# Patient Record
Sex: MALE | Race: WHITE | NOT HISPANIC OR LATINO | URBAN - METROPOLITAN AREA
[De-identification: names, ages, dates, MRNs, and addresses within clinical notes are randomized per-mention and may not be internally consistent; named-entity substitution may affect disease eponyms.]

---

## 2018-11-21 ENCOUNTER — OUTPATIENT (OUTPATIENT)
Dept: OUTPATIENT SERVICES | Facility: HOSPITAL | Age: 78
LOS: 1 days | End: 2018-11-21
Payer: MEDICARE

## 2018-11-21 VITALS
DIASTOLIC BLOOD PRESSURE: 76 MMHG | TEMPERATURE: 98 F | WEIGHT: 175.05 LBS | HEART RATE: 56 BPM | OXYGEN SATURATION: 99 % | SYSTOLIC BLOOD PRESSURE: 135 MMHG | HEIGHT: 70 IN | RESPIRATION RATE: 16 BRPM

## 2018-11-21 DIAGNOSIS — Z98.890 OTHER SPECIFIED POSTPROCEDURAL STATES: Chronic | ICD-10-CM

## 2018-11-21 DIAGNOSIS — K40.20 BILATERAL INGUINAL HERNIA, WITHOUT OBSTRUCTION OR GANGRENE, NOT SPECIFIED AS RECURRENT: ICD-10-CM

## 2018-11-21 DIAGNOSIS — Z96.653 PRESENCE OF ARTIFICIAL KNEE JOINT, BILATERAL: Chronic | ICD-10-CM

## 2018-11-21 DIAGNOSIS — Z01.818 ENCOUNTER FOR OTHER PREPROCEDURAL EXAMINATION: ICD-10-CM

## 2018-11-21 DIAGNOSIS — Z98.49 CATARACT EXTRACTION STATUS, UNSPECIFIED EYE: Chronic | ICD-10-CM

## 2018-11-21 LAB
ALBUMIN SERPL ELPH-MCNC: 3.7 G/DL — SIGNIFICANT CHANGE UP (ref 3.3–5)
ALP SERPL-CCNC: 43 U/L — SIGNIFICANT CHANGE UP (ref 30–120)
ALT FLD-CCNC: 29 U/L DA — SIGNIFICANT CHANGE UP (ref 10–60)
ANION GAP SERPL CALC-SCNC: 12 MMOL/L — SIGNIFICANT CHANGE UP (ref 5–17)
AST SERPL-CCNC: 23 U/L — SIGNIFICANT CHANGE UP (ref 10–40)
BILIRUB SERPL-MCNC: 0.6 MG/DL — SIGNIFICANT CHANGE UP (ref 0.2–1.2)
BUN SERPL-MCNC: 28 MG/DL — HIGH (ref 7–23)
CALCIUM SERPL-MCNC: 9 MG/DL — SIGNIFICANT CHANGE UP (ref 8.4–10.5)
CHLORIDE SERPL-SCNC: 105 MMOL/L — SIGNIFICANT CHANGE UP (ref 96–108)
CO2 SERPL-SCNC: 25 MMOL/L — SIGNIFICANT CHANGE UP (ref 22–31)
CREAT SERPL-MCNC: 1.09 MG/DL — SIGNIFICANT CHANGE UP (ref 0.5–1.3)
GLUCOSE SERPL-MCNC: 108 MG/DL — HIGH (ref 70–99)
HCT VFR BLD CALC: 43.1 % — SIGNIFICANT CHANGE UP (ref 39–50)
HGB BLD-MCNC: 14.7 G/DL — SIGNIFICANT CHANGE UP (ref 13–17)
MCHC RBC-ENTMCNC: 31.7 PG — SIGNIFICANT CHANGE UP (ref 27–34)
MCHC RBC-ENTMCNC: 34.1 GM/DL — SIGNIFICANT CHANGE UP (ref 32–36)
MCV RBC AUTO: 93.1 FL — SIGNIFICANT CHANGE UP (ref 80–100)
NRBC # BLD: 0 /100 WBCS — SIGNIFICANT CHANGE UP (ref 0–0)
PLATELET # BLD AUTO: 195 K/UL — SIGNIFICANT CHANGE UP (ref 150–400)
POTASSIUM SERPL-MCNC: 5.2 MMOL/L — SIGNIFICANT CHANGE UP (ref 3.5–5.3)
POTASSIUM SERPL-SCNC: 5.2 MMOL/L — SIGNIFICANT CHANGE UP (ref 3.5–5.3)
PROT SERPL-MCNC: 6.5 G/DL — SIGNIFICANT CHANGE UP (ref 6–8.3)
RBC # BLD: 4.63 M/UL — SIGNIFICANT CHANGE UP (ref 4.2–5.8)
RBC # FLD: 14.9 % — HIGH (ref 10.3–14.5)
SODIUM SERPL-SCNC: 142 MMOL/L — SIGNIFICANT CHANGE UP (ref 135–145)
WBC # BLD: 5.69 K/UL — SIGNIFICANT CHANGE UP (ref 3.8–10.5)
WBC # FLD AUTO: 5.69 K/UL — SIGNIFICANT CHANGE UP (ref 3.8–10.5)

## 2018-11-21 PROCEDURE — 80053 COMPREHEN METABOLIC PANEL: CPT

## 2018-11-21 PROCEDURE — 85027 COMPLETE CBC AUTOMATED: CPT

## 2018-11-21 PROCEDURE — G0463: CPT

## 2018-11-21 PROCEDURE — 93010 ELECTROCARDIOGRAM REPORT: CPT

## 2018-11-21 PROCEDURE — 36415 COLL VENOUS BLD VENIPUNCTURE: CPT

## 2018-11-21 PROCEDURE — 93005 ELECTROCARDIOGRAM TRACING: CPT

## 2018-11-21 NOTE — H&P PST ADULT - PSH
History of cataract extraction  bilateral 2000's  History of knee replacement, total, bilateral  left 2012  right 2014  History of rotator cuff surgery  left 1995

## 2018-11-21 NOTE — H&P PST ADULT - GASTROINTESTINAL DETAILS
no guarding/soft/bowel sounds normal/no distention/nontender/no masses palpable/no rebound tenderness

## 2018-11-21 NOTE — H&P PST ADULT - HISTORY OF PRESENT ILLNESS
79 yo male presents to [facility] for scheduled open repair bilateral inguinal hernia repair on 12/6/18 with Ethan Polanco MD.  Reports bilateral inguinal hernias for 20 years with recent discomfort right side.

## 2018-11-21 NOTE — H&P PST ADULT - PROBLEM SELECTOR PLAN 1
"Open repair bilateral inguinal hernia req PA" on 12/6/18  Diagnostics ordered  Pending medical clearance  Instructions reviewed and signed  contact info given  Best wishes offered

## 2018-11-21 NOTE — H&P PST ADULT - NEGATIVE ENMT SYMPTOMS
no hearing difficulty/no dry mouth/no nose bleeds/no recurrent cold sores/no nasal obstruction/no post-nasal discharge/no abnormal taste sensation/no gum bleeding/no nasal discharge/no ear pain/no vertigo/no tinnitus/no sinus symptoms/no nasal congestion/no throat pain/no dysphagia

## 2018-11-30 NOTE — ASU DISCHARGE PLAN (ADULT/PEDIATRIC). - MEDICATION SUMMARY - MEDICATIONS TO TAKE
I will START or STAY ON the medications listed below when I get home from the hospital:    oxyCODONE 5 mg oral tablet  -- 1 tab(s) by mouth every 4 hours, As Needed MDD:6  -- Indication: For Treatment of moderate incisional pain.    simvastatin 20 mg oral tablet  -- 1 tab(s) by mouth once a day (at bedtime)  -- Indication: For Continuation of home medication.    lisinopril-hydroCHLOROthiazide 20 mg-12.5 mg oral tablet  -- 1 tab(s) by mouth once a day  -- Indication: For Continuation of home medication.

## 2018-11-30 NOTE — ASU DISCHARGE PLAN (ADULT/PEDIATRIC). - SPECIAL INSTRUCTIONS
REST!  Relax!  Ice packs, as directed.  May take Tylenol for minor pain.  Please minimize any Aspirin, Advil or Motrin for first 48 hours.  A script has been forwarded to your pharmacy for Oxycodone to treat moderate pain.  Pain Pumps to be removed prior to first shower in 48 hours.  Take an over the counter stool softener such as COLACE twice daily to prevent constipation.  To call for ANY questions or concerns.

## 2018-11-30 NOTE — ASU DISCHARGE PLAN (ADULT/PEDIATRIC). - FOLLOWUP APPOINTMENT CLINIC/PHYSICIAN
Please call Dr. PAULINA Dumont's office ([phone]) to schedule a follow-up appointment to be seen in 7-10 days. Please call Dr. PAULINA Dumont's office ([phone]) to schedule a follow-up appointment to be seen in 2 weeks.

## 2018-11-30 NOTE — ASU DISCHARGE PLAN (ADULT/PEDIATRIC). - NOTIFY
Persistent Nausea and Vomiting/Bleeding that does not stop/Pain not relieved by Medications/Fever greater than 101/Swelling that continues Numbness, color, or temperature change to extremity/Increased Irritability or Sluggishness/Fever greater than 101/Excessive Diarrhea/Numbness, tingling/Unable to Urinate/Pain not relieved by Medications/Swelling that continues/Bleeding that does not stop/Inability to Tolerate Liquids or Foods/Persistent Nausea and Vomiting

## 2018-11-30 NOTE — ASU DISCHARGE PLAN (ADULT/PEDIATRIC). - INSTRUCTIONS
REST! Apply ice packs to incision sites 20 mins on, 20 mins off every 4 hours for first 24 hours.  If taking narcotic pain medications, may take COLACE (OTC stool softener) as directed to prevent constipation. Go with what you know! REST!  Apply ice packs to incision sites 30 mins on, 30 mins off every hour while awake for first 48 hours.

## 2018-12-05 ENCOUNTER — TRANSCRIPTION ENCOUNTER (OUTPATIENT)
Age: 78
End: 2018-12-05

## 2018-12-06 ENCOUNTER — RESULT REVIEW (OUTPATIENT)
Age: 78
End: 2018-12-06

## 2018-12-06 ENCOUNTER — OUTPATIENT (OUTPATIENT)
Dept: OUTPATIENT SERVICES | Facility: HOSPITAL | Age: 78
LOS: 1 days | End: 2018-12-06
Payer: MEDICARE

## 2018-12-06 VITALS
OXYGEN SATURATION: 97 % | DIASTOLIC BLOOD PRESSURE: 6 MMHG | HEART RATE: 69 BPM | SYSTOLIC BLOOD PRESSURE: 118 MMHG | RESPIRATION RATE: 16 BRPM

## 2018-12-06 VITALS
WEIGHT: 172.18 LBS | RESPIRATION RATE: 19 BRPM | DIASTOLIC BLOOD PRESSURE: 72 MMHG | TEMPERATURE: 98 F | SYSTOLIC BLOOD PRESSURE: 140 MMHG | HEART RATE: 51 BPM | OXYGEN SATURATION: 100 % | HEIGHT: 72 IN

## 2018-12-06 DIAGNOSIS — Z98.890 OTHER SPECIFIED POSTPROCEDURAL STATES: Chronic | ICD-10-CM

## 2018-12-06 DIAGNOSIS — K40.20 BILATERAL INGUINAL HERNIA, WITHOUT OBSTRUCTION OR GANGRENE, NOT SPECIFIED AS RECURRENT: ICD-10-CM

## 2018-12-06 DIAGNOSIS — Z96.653 PRESENCE OF ARTIFICIAL KNEE JOINT, BILATERAL: Chronic | ICD-10-CM

## 2018-12-06 DIAGNOSIS — Z01.818 ENCOUNTER FOR OTHER PREPROCEDURAL EXAMINATION: ICD-10-CM

## 2018-12-06 DIAGNOSIS — Z98.49 CATARACT EXTRACTION STATUS, UNSPECIFIED EYE: Chronic | ICD-10-CM

## 2018-12-06 PROCEDURE — C1781: CPT

## 2018-12-06 PROCEDURE — 11981 INSERTION DRUG DLVR IMPLANT: CPT | Mod: AS

## 2018-12-06 PROCEDURE — 88302 TISSUE EXAM BY PATHOLOGIST: CPT | Mod: 26

## 2018-12-06 PROCEDURE — 49505 PRP I/HERN INIT REDUC >5 YR: CPT | Mod: AS,50

## 2018-12-06 PROCEDURE — 49505 PRP I/HERN INIT REDUC >5 YR: CPT | Mod: 50

## 2018-12-06 PROCEDURE — 88302 TISSUE EXAM BY PATHOLOGIST: CPT

## 2018-12-06 RX ORDER — CEFAZOLIN SODIUM 1 G
2000 VIAL (EA) INJECTION ONCE
Qty: 0 | Refills: 0 | Status: COMPLETED | OUTPATIENT
Start: 2018-12-06 | End: 2018-12-06

## 2018-12-06 RX ORDER — HYDROMORPHONE HYDROCHLORIDE 2 MG/ML
0.5 INJECTION INTRAMUSCULAR; INTRAVENOUS; SUBCUTANEOUS
Qty: 0 | Refills: 0 | Status: DISCONTINUED | OUTPATIENT
Start: 2018-12-06 | End: 2018-12-06

## 2018-12-06 RX ORDER — BUPIVACAINE HCL/PF 7.5 MG/ML
270 VIAL (ML) INJECTION
Qty: 0 | Refills: 0 | Status: DISCONTINUED | OUTPATIENT
Start: 2018-12-06 | End: 2018-12-06

## 2018-12-06 RX ORDER — SODIUM CHLORIDE 9 MG/ML
1000 INJECTION, SOLUTION INTRAVENOUS
Qty: 0 | Refills: 0 | Status: DISCONTINUED | OUTPATIENT
Start: 2018-12-06 | End: 2018-12-06

## 2018-12-06 RX ORDER — OXYCODONE HYDROCHLORIDE 5 MG/1
1 TABLET ORAL
Qty: 0 | Refills: 0 | COMMUNITY

## 2018-12-06 RX ORDER — CHLORHEXIDINE GLUCONATE 213 G/1000ML
1 SOLUTION TOPICAL ONCE
Qty: 0 | Refills: 0 | Status: COMPLETED | OUTPATIENT
Start: 2018-12-06 | End: 2018-12-06

## 2018-12-06 RX ORDER — OXYCODONE HYDROCHLORIDE 5 MG/1
1 TABLET ORAL
Qty: 30 | Refills: 0
Start: 2018-12-06

## 2018-12-06 RX ADMIN — CHLORHEXIDINE GLUCONATE 1 APPLICATION(S): 213 SOLUTION TOPICAL at 11:53

## 2018-12-06 NOTE — BRIEF OPERATIVE NOTE - PROCEDURE
<<-----Click on this checkbox to enter Procedure Open repair of bilateral inguinal hernias with mesh  12/06/2018    Active  Wolfgang Polanco

## 2022-06-10 NOTE — H&P PST ADULT - NEGATIVE HEMATOLOGY SYMPTOMS
How Severe Are Your Spot(S)?: moderate What Type Of Note Output Would You Prefer (Optional)?: Standard Output What Is The Reason For Today's Visit?: Full Body Skin Examination no gum bleeding/no skin lumps/no nose bleeding

## 2022-09-28 ENCOUNTER — INPATIENT (INPATIENT)
Facility: HOSPITAL | Age: 82
LOS: 1 days | Discharge: HOME | DRG: 309 | End: 2022-09-30
Attending: HOSPITALIST | Admitting: INTERNAL MEDICINE
Payer: MEDICARE

## 2022-09-28 VITALS — WEIGHT: 199.96 LBS | HEIGHT: 72 IN

## 2022-09-28 DIAGNOSIS — Z96.653 PRESENCE OF ARTIFICIAL KNEE JOINT, BILATERAL: Chronic | ICD-10-CM

## 2022-09-28 DIAGNOSIS — I48.91 UNSPECIFIED ATRIAL FIBRILLATION: ICD-10-CM

## 2022-09-28 DIAGNOSIS — Z98.49 CATARACT EXTRACTION STATUS, UNSPECIFIED EYE: Chronic | ICD-10-CM

## 2022-09-28 DIAGNOSIS — Z98.890 OTHER SPECIFIED POSTPROCEDURAL STATES: Chronic | ICD-10-CM

## 2022-09-28 PROBLEM — K40.20 BILATERAL INGUINAL HERNIA, WITHOUT OBSTRUCTION OR GANGRENE, NOT SPECIFIED AS RECURRENT: Chronic | Status: ACTIVE | Noted: 2018-11-21

## 2022-09-28 PROBLEM — E78.5 HYPERLIPIDEMIA, UNSPECIFIED: Chronic | Status: ACTIVE | Noted: 2018-11-21

## 2022-09-28 PROBLEM — I10 ESSENTIAL (PRIMARY) HYPERTENSION: Chronic | Status: ACTIVE | Noted: 2018-11-21

## 2022-09-28 PROBLEM — Z85.46 PERSONAL HISTORY OF MALIGNANT NEOPLASM OF PROSTATE: Chronic | Status: ACTIVE | Noted: 2018-11-21

## 2022-09-28 LAB
ALBUMIN SERPL ELPH-MCNC: 3.5 G/DL — SIGNIFICANT CHANGE UP (ref 3.3–5)
ALP SERPL-CCNC: 63 U/L — SIGNIFICANT CHANGE UP (ref 40–120)
ALT FLD-CCNC: 25 U/L — SIGNIFICANT CHANGE UP (ref 12–78)
ANION GAP SERPL CALC-SCNC: 8 MMOL/L — SIGNIFICANT CHANGE UP (ref 5–17)
APPEARANCE UR: CLEAR — SIGNIFICANT CHANGE UP
APTT BLD: 32.4 SEC — SIGNIFICANT CHANGE UP (ref 27.5–35.5)
AST SERPL-CCNC: 12 U/L — LOW (ref 15–37)
BASOPHILS # BLD AUTO: 0 K/UL — SIGNIFICANT CHANGE UP (ref 0–0.2)
BASOPHILS NFR BLD AUTO: 0 % — SIGNIFICANT CHANGE UP (ref 0–2)
BILIRUB SERPL-MCNC: 0.8 MG/DL — SIGNIFICANT CHANGE UP (ref 0.2–1.2)
BILIRUB UR-MCNC: NEGATIVE — SIGNIFICANT CHANGE UP
BUN SERPL-MCNC: 26 MG/DL — HIGH (ref 7–23)
CALCIUM SERPL-MCNC: 8.6 MG/DL — SIGNIFICANT CHANGE UP (ref 8.5–10.1)
CHLORIDE SERPL-SCNC: 109 MMOL/L — HIGH (ref 96–108)
CO2 SERPL-SCNC: 23 MMOL/L — SIGNIFICANT CHANGE UP (ref 22–31)
COLOR SPEC: YELLOW — SIGNIFICANT CHANGE UP
CREAT SERPL-MCNC: 1.05 MG/DL — SIGNIFICANT CHANGE UP (ref 0.5–1.3)
DIFF PNL FLD: ABNORMAL
EGFR: 72 ML/MIN/1.73M2 — SIGNIFICANT CHANGE UP
EOSINOPHIL # BLD AUTO: 0 K/UL — SIGNIFICANT CHANGE UP (ref 0–0.5)
EOSINOPHIL NFR BLD AUTO: 0 % — SIGNIFICANT CHANGE UP (ref 0–6)
FLUAV AG NPH QL: SIGNIFICANT CHANGE UP
FLUBV AG NPH QL: SIGNIFICANT CHANGE UP
GLUCOSE SERPL-MCNC: 138 MG/DL — HIGH (ref 70–99)
GLUCOSE UR QL: NEGATIVE — SIGNIFICANT CHANGE UP
HCT VFR BLD CALC: 44.7 % — SIGNIFICANT CHANGE UP (ref 39–50)
HGB BLD-MCNC: 15 G/DL — SIGNIFICANT CHANGE UP (ref 13–17)
INR BLD: 1.23 RATIO — HIGH (ref 0.88–1.16)
KETONES UR-MCNC: NEGATIVE — SIGNIFICANT CHANGE UP
LEUKOCYTE ESTERASE UR-ACNC: ABNORMAL
LYMPHOCYTES # BLD AUTO: 41 % — SIGNIFICANT CHANGE UP (ref 13–44)
LYMPHOCYTES # BLD AUTO: 6.36 K/UL — HIGH (ref 1–3.3)
MAGNESIUM SERPL-MCNC: 1.9 MG/DL — SIGNIFICANT CHANGE UP (ref 1.6–2.6)
MCHC RBC-ENTMCNC: 31.9 PG — SIGNIFICANT CHANGE UP (ref 27–34)
MCHC RBC-ENTMCNC: 33.6 GM/DL — SIGNIFICANT CHANGE UP (ref 32–36)
MCV RBC AUTO: 95.1 FL — SIGNIFICANT CHANGE UP (ref 80–100)
MONOCYTES # BLD AUTO: 0.93 K/UL — HIGH (ref 0–0.9)
MONOCYTES NFR BLD AUTO: 6 % — SIGNIFICANT CHANGE UP (ref 2–14)
NEUTROPHILS # BLD AUTO: 8.22 K/UL — HIGH (ref 1.8–7.4)
NEUTROPHILS NFR BLD AUTO: 53 % — SIGNIFICANT CHANGE UP (ref 43–77)
NITRITE UR-MCNC: NEGATIVE — SIGNIFICANT CHANGE UP
NRBC # BLD: SIGNIFICANT CHANGE UP /100 WBCS (ref 0–0)
NT-PROBNP SERPL-SCNC: 2203 PG/ML — HIGH (ref 0–450)
PH UR: 5 — SIGNIFICANT CHANGE UP (ref 5–8)
PHOSPHATE SERPL-MCNC: 3.4 MG/DL — SIGNIFICANT CHANGE UP (ref 2.5–4.5)
PLATELET # BLD AUTO: 149 K/UL — LOW (ref 150–400)
POTASSIUM SERPL-MCNC: 4.1 MMOL/L — SIGNIFICANT CHANGE UP (ref 3.5–5.3)
POTASSIUM SERPL-SCNC: 4.1 MMOL/L — SIGNIFICANT CHANGE UP (ref 3.5–5.3)
PROT SERPL-MCNC: 6.6 GM/DL — SIGNIFICANT CHANGE UP (ref 6–8.3)
PROT UR-MCNC: 15
PROTHROM AB SERPL-ACNC: 14.3 SEC — HIGH (ref 10.5–13.4)
RBC # BLD: 4.7 M/UL — SIGNIFICANT CHANGE UP (ref 4.2–5.8)
RBC # FLD: 14.6 % — HIGH (ref 10.3–14.5)
RSV RNA NPH QL NAA+NON-PROBE: SIGNIFICANT CHANGE UP
SARS-COV-2 RNA SPEC QL NAA+PROBE: SIGNIFICANT CHANGE UP
SODIUM SERPL-SCNC: 140 MMOL/L — SIGNIFICANT CHANGE UP (ref 135–145)
SP GR SPEC: 1.01 — SIGNIFICANT CHANGE UP (ref 1.01–1.02)
TROPONIN I, HIGH SENSITIVITY RESULT: 39.51 NG/L — SIGNIFICANT CHANGE UP
UROBILINOGEN FLD QL: NEGATIVE — SIGNIFICANT CHANGE UP
WBC # BLD: 15.51 K/UL — HIGH (ref 3.8–10.5)
WBC # FLD AUTO: 15.51 K/UL — HIGH (ref 3.8–10.5)

## 2022-09-28 PROCEDURE — 83735 ASSAY OF MAGNESIUM: CPT

## 2022-09-28 PROCEDURE — 76770 US EXAM ABDO BACK WALL COMP: CPT

## 2022-09-28 PROCEDURE — 83036 HEMOGLOBIN GLYCOSYLATED A1C: CPT

## 2022-09-28 PROCEDURE — 93312 ECHO TRANSESOPHAGEAL: CPT

## 2022-09-28 PROCEDURE — 80053 COMPREHEN METABOLIC PANEL: CPT

## 2022-09-28 PROCEDURE — 88108 CYTOPATH CONCENTRATE TECH: CPT

## 2022-09-28 PROCEDURE — 92960 CARDIOVERSION ELECTRIC EXT: CPT

## 2022-09-28 PROCEDURE — 71045 X-RAY EXAM CHEST 1 VIEW: CPT | Mod: 26

## 2022-09-28 PROCEDURE — 93306 TTE W/DOPPLER COMPLETE: CPT

## 2022-09-28 PROCEDURE — 85025 COMPLETE CBC W/AUTO DIFF WBC: CPT

## 2022-09-28 PROCEDURE — 84443 ASSAY THYROID STIM HORMONE: CPT

## 2022-09-28 PROCEDURE — 84439 ASSAY OF FREE THYROXINE: CPT

## 2022-09-28 PROCEDURE — 93320 DOPPLER ECHO COMPLETE: CPT

## 2022-09-28 PROCEDURE — 93005 ELECTROCARDIOGRAM TRACING: CPT

## 2022-09-28 PROCEDURE — 36415 COLL VENOUS BLD VENIPUNCTURE: CPT

## 2022-09-28 PROCEDURE — 87040 BLOOD CULTURE FOR BACTERIA: CPT

## 2022-09-28 PROCEDURE — 84484 ASSAY OF TROPONIN QUANT: CPT

## 2022-09-28 PROCEDURE — 93325 DOPPLER ECHO COLOR FLOW MAPG: CPT

## 2022-09-28 PROCEDURE — 93010 ELECTROCARDIOGRAM REPORT: CPT

## 2022-09-28 PROCEDURE — 99291 CRITICAL CARE FIRST HOUR: CPT

## 2022-09-28 RX ORDER — DILTIAZEM HCL 120 MG
5 CAPSULE, EXT RELEASE 24 HR ORAL ONCE
Refills: 0 | Status: COMPLETED | OUTPATIENT
Start: 2022-09-28 | End: 2022-09-28

## 2022-09-28 RX ORDER — ACYCLOVIR SODIUM 500 MG
1 VIAL (EA) INTRAVENOUS
Qty: 0 | Refills: 0 | DISCHARGE

## 2022-09-28 RX ORDER — SODIUM CHLORIDE 9 MG/ML
500 INJECTION INTRAMUSCULAR; INTRAVENOUS; SUBCUTANEOUS ONCE
Refills: 0 | Status: COMPLETED | OUTPATIENT
Start: 2022-09-28 | End: 2022-09-28

## 2022-09-28 RX ORDER — SIMVASTATIN 20 MG/1
1 TABLET, FILM COATED ORAL
Qty: 0 | Refills: 0 | DISCHARGE

## 2022-09-28 RX ORDER — DILTIAZEM HCL 120 MG
30 CAPSULE, EXT RELEASE 24 HR ORAL ONCE
Refills: 0 | Status: COMPLETED | OUTPATIENT
Start: 2022-09-28 | End: 2022-09-28

## 2022-09-28 RX ORDER — ENOXAPARIN SODIUM 100 MG/ML
90 INJECTION SUBCUTANEOUS ONCE
Refills: 0 | Status: COMPLETED | OUTPATIENT
Start: 2022-09-28 | End: 2022-09-28

## 2022-09-28 RX ORDER — TAMSULOSIN HYDROCHLORIDE 0.4 MG/1
1 CAPSULE ORAL
Qty: 0 | Refills: 0 | DISCHARGE

## 2022-09-28 RX ORDER — LISINOPRIL/HYDROCHLOROTHIAZIDE 10-12.5 MG
1 TABLET ORAL
Qty: 0 | Refills: 0 | DISCHARGE

## 2022-09-28 RX ADMIN — SODIUM CHLORIDE 500 MILLILITER(S): 9 INJECTION INTRAMUSCULAR; INTRAVENOUS; SUBCUTANEOUS at 17:26

## 2022-09-28 RX ADMIN — Medication 5 MILLIGRAM(S): at 17:25

## 2022-09-28 RX ADMIN — ENOXAPARIN SODIUM 90 MILLIGRAM(S): 100 INJECTION SUBCUTANEOUS at 20:00

## 2022-09-28 RX ADMIN — Medication 5 MILLIGRAM(S): at 16:50

## 2022-09-28 RX ADMIN — Medication 30 MILLIGRAM(S): at 17:25
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
## 2022-09-28 NOTE — ED STATDOCS - NSICDXPASTSURGICALHX_GEN_ALL_CORE_FT
PAST SURGICAL HISTORY:  History of cataract extraction bilateral 2000's    History of knee replacement, total, bilateral left 2012  right 2014    History of rotator cuff surgery left 1995

## 2022-09-28 NOTE — ED PROVIDER NOTE - MUSCULOSKELETAL, MLM
Spine appears normal, range of motion is not limited, no muscle or joint tenderness. CANSECO x4 Spine appears normal, range of motion is not limited, no muscle or joint tenderness. CANSECO x4, no focal extremity swelling nor tenderness.

## 2022-09-28 NOTE — ED ADULT TRIAGE NOTE - CHIEF COMPLAINT QUOTE
Pt presents to er with complaints of right sided chest pain radiating to the left side, denies sob, fevers at this time, took 162mg asa prior to arrival.

## 2022-09-28 NOTE — ED ADULT NURSE REASSESSMENT NOTE - NS ED NURSE REASSESS COMMENT FT1
assume care from tiffany spann, pt resting comfortably vss, Hr coming down, waiting for room md contino aware wctm.

## 2022-09-28 NOTE — ED PROVIDER NOTE - CARE PLAN
1 Principal Discharge DX:	Rapid atrial fibrillation  Secondary Diagnosis:	New onset atrial fibrillation

## 2022-09-28 NOTE — ED PROVIDER NOTE - SKIN, MLM
Skin normal color for race, warm, dry and intact. No evidence of rash. Skin normal color for race, warm, dry and intact. No evidence of rash.  No tactile warmth

## 2022-09-28 NOTE — ED STATDOCS - NSICDXPASTMEDICALHX_GEN_ALL_CORE_FT
PAST MEDICAL HISTORY:  Dyslipidemia     History of prostate cancer 2012    Hypertension     Inguinal hernia, bilateral

## 2022-09-28 NOTE — ED PROVIDER NOTE - RESPIRATORY, MLM
Breath sounds clear and equal bilaterally. Breath sounds clear and equal bilaterally.  Normal respirations

## 2022-09-28 NOTE — ED PROVIDER NOTE - ENMT, MLM
Airway patent, Nasal mucosa clear. Mouth with normal mucosa. Throat has no vesicles, no oropharyngeal exudates and uvula is midline. oropharynx clear mucus membrane slightly dry

## 2022-09-28 NOTE — ED STATDOCS - NSICDXFAMILYHX_GEN_ALL_CORE_FT
FAMILY HISTORY:  Sibling  Still living? No  Family history of prostate cancer, Age at diagnosis: Age Unknown

## 2022-09-28 NOTE — ED STATDOCS - NS_ ATTENDINGSCRIBEDETAILS _ED_A_ED_FT
I, Nikunj Cruz MD, performed the initial face to face bedside interview with this patient regarding history of present illness and determined that the patient should be seen in the main ED.  The history, was documented by the scribe in my presence and I attest to the accuracy of the documentation.

## 2022-09-28 NOTE — ED PROVIDER NOTE - CPE EDP RESP NORM
normal... Detail Level: Detailed General Sunscreen Counseling: I recommended a broad spectrum sunscreen with a SPF of 30 or higher.  I explained that SPF 30 sunscreens block approximately 97 percent of the sun's harmful rays.  Sunscreens should be applied at least 15 minutes prior to expected sun exposure and then every 2 hours after that as long as sun exposure continues. If swimming or exercising sunscreen should be reapplied every 45 minutes to an hour after getting wet or sweating.  One ounce, or the equivalent of a shot glass full of sunscreen, is adequate to protect the skin not covered by a bathing suit. I also recommended a lip balm with a sunscreen as well. Sun protective clothing can be used in lieu of sunscreen but must be worn the entire time you are exposed to the sun's rays.

## 2022-09-28 NOTE — ED STATDOCS - PROGRESS NOTE DETAILS
Autumn Gomez for Dr. Cruz:   83 y/o male with PMHx of non-hodgkins lymphoma presents to the ED c/o chest discomfort. Pt states he was wearing an apple watch and noticed his heart rate jumping up to the 140s. Pt states he is here for his granddaughters wedding and has anxiety. Pt states he is on chemo for his lymphoma. Pt states he had an episode of hematuria yesterday. Pt endorses some SOB. Pt is on acyclovir and HTN medication. Will send pt to main ED for further evaluation.

## 2022-09-28 NOTE — ED PROVIDER NOTE - PROGRESS NOTE DETAILS
MARYJANE Red MD:  2nd EKG s/p Cardizem IVP X 2 & po: AF rate controlled at 96 bpm, BP stable. MARYJANE Red MD:  Dr. Carrington aware of Tele admission, requests LOvenox SQ

## 2022-09-29 DIAGNOSIS — Z98.890 OTHER SPECIFIED POSTPROCEDURAL STATES: Chronic | ICD-10-CM

## 2022-09-29 DIAGNOSIS — I48.91 UNSPECIFIED ATRIAL FIBRILLATION: ICD-10-CM

## 2022-09-29 LAB
ADD ON TEST-SPECIMEN IN LAB: SIGNIFICANT CHANGE UP
ALBUMIN SERPL ELPH-MCNC: 3 G/DL — LOW (ref 3.3–5)
ALP SERPL-CCNC: 57 U/L — SIGNIFICANT CHANGE UP (ref 40–120)
ALT FLD-CCNC: 20 U/L — SIGNIFICANT CHANGE UP (ref 12–78)
ANION GAP SERPL CALC-SCNC: 7 MMOL/L — SIGNIFICANT CHANGE UP (ref 5–17)
AST SERPL-CCNC: 8 U/L — LOW (ref 15–37)
BASOPHILS # BLD AUTO: 0 K/UL — SIGNIFICANT CHANGE UP (ref 0–0.2)
BASOPHILS NFR BLD AUTO: 0 % — SIGNIFICANT CHANGE UP (ref 0–2)
BILIRUB SERPL-MCNC: 0.8 MG/DL — SIGNIFICANT CHANGE UP (ref 0.2–1.2)
BUN SERPL-MCNC: 23 MG/DL — SIGNIFICANT CHANGE UP (ref 7–23)
CALCIUM SERPL-MCNC: 8.3 MG/DL — LOW (ref 8.5–10.1)
CHLORIDE SERPL-SCNC: 112 MMOL/L — HIGH (ref 96–108)
CO2 SERPL-SCNC: 22 MMOL/L — SIGNIFICANT CHANGE UP (ref 22–31)
CREAT SERPL-MCNC: 0.86 MG/DL — SIGNIFICANT CHANGE UP (ref 0.5–1.3)
EGFR: 88 ML/MIN/1.73M2 — SIGNIFICANT CHANGE UP
EOSINOPHIL # BLD AUTO: 0.13 K/UL — SIGNIFICANT CHANGE UP (ref 0–0.5)
EOSINOPHIL NFR BLD AUTO: 1 % — SIGNIFICANT CHANGE UP (ref 0–6)
GLUCOSE SERPL-MCNC: 133 MG/DL — HIGH (ref 70–99)
HCT VFR BLD CALC: 41.7 % — SIGNIFICANT CHANGE UP (ref 39–50)
HGB BLD-MCNC: 14.3 G/DL — SIGNIFICANT CHANGE UP (ref 13–17)
LYMPHOCYTES # BLD AUTO: 48 % — HIGH (ref 13–44)
LYMPHOCYTES # BLD AUTO: 6.05 K/UL — HIGH (ref 1–3.3)
MCHC RBC-ENTMCNC: 32.9 PG — SIGNIFICANT CHANGE UP (ref 27–34)
MCHC RBC-ENTMCNC: 34.3 GM/DL — SIGNIFICANT CHANGE UP (ref 32–36)
MCV RBC AUTO: 95.9 FL — SIGNIFICANT CHANGE UP (ref 80–100)
MONOCYTES # BLD AUTO: 0.76 K/UL — SIGNIFICANT CHANGE UP (ref 0–0.9)
MONOCYTES NFR BLD AUTO: 6 % — SIGNIFICANT CHANGE UP (ref 2–14)
NEUTROPHILS # BLD AUTO: 4.54 K/UL — SIGNIFICANT CHANGE UP (ref 1.8–7.4)
NEUTROPHILS NFR BLD AUTO: 36 % — LOW (ref 43–77)
NRBC # BLD: SIGNIFICANT CHANGE UP /100 WBCS (ref 0–0)
PLATELET # BLD AUTO: 141 K/UL — LOW (ref 150–400)
POTASSIUM SERPL-MCNC: 3.7 MMOL/L — SIGNIFICANT CHANGE UP (ref 3.5–5.3)
POTASSIUM SERPL-SCNC: 3.7 MMOL/L — SIGNIFICANT CHANGE UP (ref 3.5–5.3)
PROT SERPL-MCNC: 5.7 GM/DL — LOW (ref 6–8.3)
RBC # BLD: 4.35 M/UL — SIGNIFICANT CHANGE UP (ref 4.2–5.8)
RBC # FLD: 14.6 % — HIGH (ref 10.3–14.5)
SODIUM SERPL-SCNC: 141 MMOL/L — SIGNIFICANT CHANGE UP (ref 135–145)
TROPONIN I, HIGH SENSITIVITY RESULT: 40.78 NG/L — SIGNIFICANT CHANGE UP
WBC # BLD: 12.6 K/UL — HIGH (ref 3.8–10.5)
WBC # FLD AUTO: 12.6 K/UL — HIGH (ref 3.8–10.5)

## 2022-09-29 PROCEDURE — 93325 DOPPLER ECHO COLOR FLOW MAPG: CPT | Mod: 26

## 2022-09-29 PROCEDURE — 99223 1ST HOSP IP/OBS HIGH 75: CPT

## 2022-09-29 PROCEDURE — 93010 ELECTROCARDIOGRAM REPORT: CPT

## 2022-09-29 PROCEDURE — 12345: CPT | Mod: NC

## 2022-09-29 PROCEDURE — 93312 ECHO TRANSESOPHAGEAL: CPT | Mod: 26

## 2022-09-29 PROCEDURE — 93320 DOPPLER ECHO COMPLETE: CPT | Mod: 26

## 2022-09-29 PROCEDURE — 99223 1ST HOSP IP/OBS HIGH 75: CPT | Mod: 25

## 2022-09-29 PROCEDURE — 93306 TTE W/DOPPLER COMPLETE: CPT | Mod: 26

## 2022-09-29 PROCEDURE — 92960 CARDIOVERSION ELECTRIC EXT: CPT

## 2022-09-29 RX ORDER — LANOLIN ALCOHOL/MO/W.PET/CERES
3 CREAM (GRAM) TOPICAL AT BEDTIME
Refills: 0 | Status: DISCONTINUED | OUTPATIENT
Start: 2022-09-29 | End: 2022-09-30

## 2022-09-29 RX ORDER — METOPROLOL TARTRATE 50 MG
25 TABLET ORAL ONCE
Refills: 0 | Status: COMPLETED | OUTPATIENT
Start: 2022-09-29 | End: 2022-09-29

## 2022-09-29 RX ORDER — LISINOPRIL 2.5 MG/1
10 TABLET ORAL DAILY
Refills: 0 | Status: DISCONTINUED | OUTPATIENT
Start: 2022-09-29 | End: 2022-09-30

## 2022-09-29 RX ORDER — ENOXAPARIN SODIUM 100 MG/ML
90 INJECTION SUBCUTANEOUS EVERY 12 HOURS
Refills: 0 | Status: DISCONTINUED | OUTPATIENT
Start: 2022-09-29 | End: 2022-09-30

## 2022-09-29 RX ORDER — TAMSULOSIN HYDROCHLORIDE 0.4 MG/1
0.4 CAPSULE ORAL AT BEDTIME
Refills: 0 | Status: DISCONTINUED | OUTPATIENT
Start: 2022-09-29 | End: 2022-09-30

## 2022-09-29 RX ORDER — HYDROCHLOROTHIAZIDE 25 MG
12.5 TABLET ORAL DAILY
Refills: 0 | Status: DISCONTINUED | OUTPATIENT
Start: 2022-09-29 | End: 2022-09-29

## 2022-09-29 RX ORDER — ONDANSETRON 8 MG/1
4 TABLET, FILM COATED ORAL EVERY 8 HOURS
Refills: 0 | Status: DISCONTINUED | OUTPATIENT
Start: 2022-09-29 | End: 2022-09-30

## 2022-09-29 RX ORDER — CEFTRIAXONE 500 MG/1
1000 INJECTION, POWDER, FOR SOLUTION INTRAMUSCULAR; INTRAVENOUS ONCE
Refills: 0 | Status: COMPLETED | OUTPATIENT
Start: 2022-09-29 | End: 2022-09-29

## 2022-09-29 RX ORDER — ACYCLOVIR SODIUM 500 MG
400 VIAL (EA) INTRAVENOUS
Refills: 0 | Status: DISCONTINUED | OUTPATIENT
Start: 2022-09-29 | End: 2022-09-30

## 2022-09-29 RX ORDER — ACETAMINOPHEN 500 MG
650 TABLET ORAL EVERY 6 HOURS
Refills: 0 | Status: DISCONTINUED | OUTPATIENT
Start: 2022-09-29 | End: 2022-09-30

## 2022-09-29 RX ORDER — CEFTRIAXONE 500 MG/1
INJECTION, POWDER, FOR SOLUTION INTRAMUSCULAR; INTRAVENOUS
Refills: 0 | Status: DISCONTINUED | OUTPATIENT
Start: 2022-09-29 | End: 2022-09-30

## 2022-09-29 RX ORDER — CEFTRIAXONE 500 MG/1
1000 INJECTION, POWDER, FOR SOLUTION INTRAMUSCULAR; INTRAVENOUS EVERY 24 HOURS
Refills: 0 | Status: DISCONTINUED | OUTPATIENT
Start: 2022-09-30 | End: 2022-09-30

## 2022-09-29 RX ORDER — DILTIAZEM HCL 120 MG
30 CAPSULE, EXT RELEASE 24 HR ORAL EVERY 6 HOURS
Refills: 0 | Status: DISCONTINUED | OUTPATIENT
Start: 2022-09-29 | End: 2022-09-29

## 2022-09-29 RX ORDER — POTASSIUM CHLORIDE 20 MEQ
40 PACKET (EA) ORAL ONCE
Refills: 0 | Status: COMPLETED | OUTPATIENT
Start: 2022-09-29 | End: 2022-09-29

## 2022-09-29 RX ORDER — METOPROLOL TARTRATE 50 MG
25 TABLET ORAL
Refills: 0 | Status: DISCONTINUED | OUTPATIENT
Start: 2022-09-29 | End: 2022-09-30

## 2022-09-29 RX ADMIN — ENOXAPARIN SODIUM 90 MILLIGRAM(S): 100 INJECTION SUBCUTANEOUS at 09:44

## 2022-09-29 RX ADMIN — TAMSULOSIN HYDROCHLORIDE 0.4 MILLIGRAM(S): 0.4 CAPSULE ORAL at 21:24

## 2022-09-29 RX ADMIN — Medication 400 MILLIGRAM(S): at 06:39

## 2022-09-29 RX ADMIN — Medication 25 MILLIGRAM(S): at 14:01

## 2022-09-29 RX ADMIN — LISINOPRIL 10 MILLIGRAM(S): 2.5 TABLET ORAL at 09:44

## 2022-09-29 RX ADMIN — Medication 30 MILLIGRAM(S): at 06:39

## 2022-09-29 RX ADMIN — ENOXAPARIN SODIUM 90 MILLIGRAM(S): 100 INJECTION SUBCUTANEOUS at 21:24

## 2022-09-29 RX ADMIN — CEFTRIAXONE 100 MILLIGRAM(S): 500 INJECTION, POWDER, FOR SOLUTION INTRAMUSCULAR; INTRAVENOUS at 12:09

## 2022-09-29 RX ADMIN — Medication 40 MILLIEQUIVALENT(S): at 09:44

## 2022-09-29 RX ADMIN — Medication 25 MILLIGRAM(S): at 21:24

## 2022-09-29 NOTE — H&P ADULT - NSHPPHYSICALEXAM_GEN_ALL_CORE
Vital Signs Last 24 Hrs  T(C): 36.4 (28 Sep 2022 23:12), Max: 37.2 (28 Sep 2022 15:49)  T(F): 97.6 (28 Sep 2022 23:12), Max: 99 (28 Sep 2022 15:49)  HR: 106 (28 Sep 2022 23:12) (82 - 130)  BP: 104/78 (28 Sep 2022 23:12) (104/78 - 136/79)  BP(mean): 85 (28 Sep 2022 23:12) (85 - 111)  RR: 18 (28 Sep 2022 23:12) (18 - 24)  SpO2: 98% (28 Sep 2022 23:12) (96% - 100%)    Parameters below as of 28 Sep 2022 23:12  Patient On (Oxygen Delivery Method): room air

## 2022-09-29 NOTE — PATIENT PROFILE ADULT - FALL HARM RISK - UNIVERSAL INTERVENTIONS
Bed in lowest position, wheels locked, appropriate side rails in place/Call bell, personal items and telephone in reach/Instruct patient to call for assistance before getting out of bed or chair/Non-slip footwear when patient is out of bed/[place] to call system/Physically safe environment - no spills, clutter or unnecessary equipment/Purposeful Proactive Rounding/Room/bathroom lighting operational, light cord in reach

## 2022-09-29 NOTE — CONSULT NOTE ADULT - PROBLEM SELECTOR RECOMMENDATION 9
-New onset afib. Prelim review of echo - no MV disease.  TSH, T4 normal.    -Discussed w/ oncologist Dr. EARNEST Silver in [place]:  Brukinsa (Zanubrutinib) has <3% risk of causing afib,   a related agent Ibrutinib has substantially higher risk.  Cannot exclude this as cause - OK to hold this medication.  -Afib has been persistent for a few days based on applewatch reading.  Will plan for MANNIE & CV today.  -PostCV will change lovenox to eliquis 5 mg BID.    D/w Dr. Yap, & Genevieve Fair-pt's granddaughter &   PA w/ [facility] cardiology at [facility]

## 2022-09-29 NOTE — PACU DISCHARGE NOTE - COMMENTS
Report given to Paulina spann on 3 north .  Pt is alert and oriented x 3. vital signs stable. monitor pattern NSR,  NPO till 1911 maintained.  Pt denies pain or discomfort.  Safety maintained.

## 2022-09-29 NOTE — PROCEDURAL SAFETY CHECKLIST WITH OR WITHOUT SEDATION - NSPOSTCOMMENTFT_GEN_ALL_CORE
Pt is alert and oriented x 3 vital signs stable.  monitor patter in CRISTIAN. Time out done, Anaesthesia at the bedside.  Probe passed at 1654 without difficulty.  Bubble study x 1 @1703 Probe out at 1705 without dificulty.  Pt cardioverted with 200 joules at 1706 and reveals regular sinus rhythm at 77.  Awaiting 12 lead EKG to verify.  EKG verified and is NSR.  Pt tolerated the procedure well.

## 2022-09-29 NOTE — CONSULT NOTE ADULT - SUBJECTIVE AND OBJECTIVE BOX
83 y/o male with PMHx of non-Hodgkin's lymphoma (on chemo), hx of prostate Ca, Hypertension, anf Hyperlipidemia presents to the [facility] for further evaluation and management of c/o midsternal chest pressure associated with palpitations. The patient states he noticed his heart rate to be in the 140s/min on his "Apple Watch" which prompted his visit to the [facility]. The patient states that he is visiting from [place] for his granddaughter's wedding. The patient admits to associated shortness of breath. Additionally notes an episode of hematuria yesterday. The patient denies any associated subjective fevers, chills, or cough.  Vital signs in triage => /66, /min, RR 23/min, TMax 98.8'F, SpO2 96% on room air. Labs => WBC 15.51, , INR 1.23, BUN/Cr 26/1.05, Glu 138, TnI (-) x 1, proBNP 2203, UA (+). In the ED the patient received Diltiazem 5mg IVP x 2, Diltiazem 30mg PO x 1, LMWH 90mg sq x 1, and NS 500mL x 1.   (29 Sep 2022 00:42)    -admitted for chest discomfort w/ new onset afib w/ RVR.  Also w/ UTI.  Trops were neg, ECG w/ no ischemic changes.  Chest discomfort likely 2/2 afib,.  -1st noted HR elevation in 140s while on plane from [place]   to [place] to attend granddaughter's wedding this Sat.  No symptoms w/ this but yesterday evening noted  pressure type discomfort in chest.   -Currently minimally symptomatic, HRs in 110s-130s on etel  still in persistent afib.    PAST MEDICAL AND SURGICAL HISTORY:  PAST MEDICAL & SURGICAL HISTORY:  Inguinal hernia, bilateral      Dyslipidemia      Hypertension      History of prostate cancer  2012      NHL (non-Hodgkin&#x27;s lymphoma)      History of knee replacement, total, bilateral  left 2012  right 2014      History of rotator cuff surgery  left 1995      History of cataract extraction  bilateral 2000&#x27;s      History of hernia repair          ALLERGIES:  Allergies    No Known Allergies    Intolerances        SOCIAL HISTORY:  Social History:  Lives at home  Non-smoker (29 Sep 2022 00:42)      FAMILY  HISTORY:  FAMILY HISTORY:  Family history of prostate cancer (Sibling)        MEDICATIONS:  OUTPATIENT:  Home Medications:  acyclovir 400 mg oral tablet: 1 tab(s) orally 2 times a day (28 Sep 2022 21:27)  Brukinsa 80 mg oral capsule: 2 cap(s) orally 2 times a day  ***pt has supply at home, daughter Marleen can bring in if needed*** (28 Sep 2022 21:27)  lisinopril-hydrochlorothiazide 10 mg-12.5 mg oral tablet: 1 tab(s) orally once a day (in the morning) (28 Sep 2022 21:27)  Multiple Vitamins oral tablet: 1 tab(s) orally once a day (28 Sep 2022 21:27)  tamsulosin 0.4 mg oral capsule: 1 cap(s) orally once a day (in the evening) (28 Sep 2022 21:27)      INPATIENT:  MEDICATIONS  (STANDING):  acyclovir   Oral Tab/Cap 400 milliGRAM(s) Oral two times a day  cefTRIAXone   IVPB      enoxaparin Injectable 90 milliGRAM(s) SubCutaneous every 12 hours  lisinopril 10 milliGRAM(s) Oral daily  metoprolol tartrate 25 milliGRAM(s) Oral two times a day  tamsulosin 0.4 milliGRAM(s) Oral at bedtime    MEDICATIONS  (PRN):  acetaminophen     Tablet .. 650 milliGRAM(s) Oral every 6 hours PRN Temp greater or equal to 38C (100.4F), Mild Pain (1 - 3)  aluminum hydroxide/magnesium hydroxide/simethicone Suspension 30 milliLiter(s) Oral every 4 hours PRN Dyspepsia  melatonin 3 milliGRAM(s) Oral at bedtime PRN Insomnia  ondansetron Injectable 4 milliGRAM(s) IV Push every 8 hours PRN Nausea and/or Vomiting    MEDICATIONS  (PRN):  acetaminophen     Tablet .. 650 milliGRAM(s) Oral every 6 hours PRN Temp greater or equal to 38C (100.4F), Mild Pain (1 - 3)  aluminum hydroxide/magnesium hydroxide/simethicone Suspension 30 milliLiter(s) Oral every 4 hours PRN Dyspepsia  melatonin 3 milliGRAM(s) Oral at bedtime PRN Insomnia  ondansetron Injectable 4 milliGRAM(s) IV Push every 8 hours PRN Nausea and/or Vomiting      REVIEW OF SYSTEMS:  ===============================  ===============================  CONSTITUTIONAL: No weakness, fevers or chills  EYES/ENT: No visual changes;  No vertigo or throat pain   NECK: No pain or stiffness  RESPIRATORY: No cough, wheezing, hemoptysis; No shortness of breath  CARDIOVASCULAR: No chest pain or palpitations  GASTROINTESTINAL: No abdominal or epigastric pain. No nausea, vomiting, or hematemesis;   No diarrhea or constipation. No melena or hematochezia.  GENITOURINARY: No dysuria, frequency or hematuria  NEUROLOGICAL: No numbness or weakness  SKIN: No itching, burning, rashes, or lesions   All other review of systems is negative unless indicated above    Vital Signs Last 24 Hrs  T(C): 36.9 (29 Sep 2022 07:54), Max: 37.1 (28 Sep 2022 17:25)  T(F): 98.5 (29 Sep 2022 07:54), Max: 98.8 (28 Sep 2022 17:25)  HR: 126 (29 Sep 2022 14:00) (87 - 130)  BP: 125/85 (29 Sep 2022 14:00) (104/78 - 129/89)  BP(mean): 80 (29 Sep 2022 01:49) (80 - 85)  RR: 19 (29 Sep 2022 07:54) (18 - 24)  SpO2: 98% (29 Sep 2022 07:54) (96% - 100%)    Parameters below as of 29 Sep 2022 07:54  Patient On (Oxygen Delivery Method): room air        I&O's Summary      I&O's Detail      PHYSICAL EXAM:    Constitutional: NAD, awake and alert, well-developed  HEENT: PERR, EOMI,  No oral cyananosis.  Neck:  supple,  No JVD  Respiratory: Breath sounds are clear bilaterally, No wheezing, rales or rhonchi  Cardiovascular: S1 and S2, irregularly irregular rhythm, no Murmurs, gallops or rubs  Gastrointestinal: Bowel Sounds present, soft, nontender.   Extremities: No peripheral edema. No clubbing or cyanosis.  Vascular: 2+ peripheral pulses  Neurological: A/O x 3, no focal deficits  Musculoskeletal: no calf tenderness.  Skin: No rashes.    ===============================  ===============================  LABS:                         14.3   12.60 )-----------( 141      ( 29 Sep 2022 06:06 )             41.7                         15.0   15.51 )-----------( 149      ( 28 Sep 2022 16:36 )             44.7     29 Sep 2022 06:06    141    |  112    |  23     ----------------------------<  133    3.7     |  22     |  0.86   28 Sep 2022 16:36    140    |  109    |  26     ----------------------------<  138    4.1     |  23     |  1.05     Ca    8.3        29 Sep 2022 06:06  Ca    8.6        28 Sep 2022 16:36  Phos  3.4       28 Sep 2022 16:36  Mg     2.0       29 Sep 2022 06:06  Mg     1.9       28 Sep 2022 16:36    TPro  5.7    /  Alb  3.0    /  TBili  0.8    /  DBili  x      /  AST  8      /  ALT  20     /  AlkPhos  57     29 Sep 2022 06:06  TPro  6.6    /  Alb  3.5    /  TBili  0.8    /  DBili  x      /  AST  12     /  ALT  25     /  AlkPhos  63     28 Sep 2022 16:36    PT/INR - ( 28 Sep 2022 16:36 )   PT: 14.3 sec;   INR: 1.23 ratio         PTT - ( 28 Sep 2022 16:36 )  PTT:32.4 sec    THYROID STUDIES:    ===============================  ===============================  CARDIAC BIOMARKERS:  -------  -BNP VALUES:  09-28 @ 16:36  Pro Bnp 2203    -------  -TROPONIN VALUES:   Troponin I, High Sensitivity Result: 40.78 ng/L (09-29-22 @ 01:39)  Troponin I, High Sensitivity Result: 39.51 ng/L (09-28-22 @ 16:36)  ===============================  ===============================  EKG: afib w/ RVR in 130s    TELE: afib 110s-130s  ===============================  RADIOLOGY:      ACC: [id number] EXAM:  XR CHEST PORTABLE URGENT 1V                          PROCEDURE DATE:  09/28/2022          INTERPRETATION:  INDICATION: Palpitations    COMPARISON: None    FINDINGS:  An AP portable upright view of the chest demonstrates no focal   infiltrates on either side. Linear atelectasis at the right lung base is   seen. There are no air bronchograms. No pleural effusions are seen. There   is no hilar or mediastinal widening. The left ventricle appears slightly   prominent but withoutpulmonary edema. There are degenerative changes of   the spine.    IMPRESSION:  1. Prominent left ventricle without CHF.  2. Linear atelectasis at the right lung base.  3. No acute abnormalities are demonstrated.    --- End of Report ---            ANNA LEUNG MD; Attending Radiologist  This document has been electronically signed. Sep 28 2022  5:29PM    ===============================  ECHO:  Prelim - normal EF no LA    ===============================    Estuardo Gooden M.D.  Cardiology, [facility]  Cell: [phone]  Office:   [phone] ([facility] Office)  [phone] ([facility] Office)    ===============================

## 2022-09-29 NOTE — H&P ADULT - NSICDXPASTSURGICALHX_GEN_ALL_CORE_FT
PAST SURGICAL HISTORY:  History of cataract extraction bilateral 2000's    History of knee replacement, total, bilateral left 2012  right 2014    History of rotator cuff surgery left 1995     PAST SURGICAL HISTORY:  History of cataract extraction bilateral 2000's    History of hernia repair     History of knee replacement, total, bilateral left 2012  right 2014    History of rotator cuff surgery left 1995

## 2022-09-29 NOTE — CONSULT NOTE ADULT - SUBJECTIVE AND OBJECTIVE BOX
HPI:  81 y/o male with PMHx of non-Hodgkin's lymphoma (on Brukinsa 180 mg PO daily), hx of prostate Ca, Hypertension, Hyperlipidemia presents to the -ED for further evaluation and management of c/o midsternal chest pressure associated with palpitations. The patient states he noticed his heart rate to be in the 140s/min on his "Apple Watch" which prompted his visit to the ED. The patient states that he is visiting from [place] for his granddaughter's wedding. The patient admits to associated shortness of breath. Additionally notes an episode of hematuria yesterday. The patient denies any associated subjective fevers, chills, or cough.  Vital signs in triage => /66, /min, RR 23/min, TMax 98.8'F, SpO2 96% on room air. Labs => WBC 15.51, , INR 1.23, BUN/Cr 26/1.05, Glu 138, TnI (-) x 1, proBNP 2203, UA (+). In the ED the patient received Diltiazem 5mg IVP x 2, Diltiazem 30mg PO x 1, LMWH 90mg sq x 1, and NS 500mL x 1.   (29 Sep 2022 00:42)    PAST MEDICAL & SURGICAL HISTORY:  Inguinal hernia, bilateral  Dyslipidemia  Hypertension  History of prostate cancer    NHL (non-Hodgkin lymphoma)  History of knee replacement, total, bilateral-left , right   History of rotator cuff surgery-left   History of cataract extraction- bilateral   History of hernia repair    MEDICATIONS  (STANDING):  acyclovir   Oral Tab/Cap 400 milliGRAM(s) Oral two times a day  cefTRIAXone  IVPB      cefTRIAXone  IVPB 1000 milliGRAM(s) IV Intermittent once  diltiazem Tablet 30 milliGRAM(s) Oral every 6 hours  enoxaparin Injectable 90 milliGRAM(s) SubCutaneous every 12 hours  hydrochlorothiazide 12.5 milliGRAM(s) Oral daily  lisinopril 10 milliGRAM(s) Oral daily  tamsulosin 0.4 milliGRAM(s) Oral at bedtime    MEDICATIONS  (PRN):  acetaminophen     Tablet .. 650 milliGRAM(s) Oral every 6 hours PRN Temp greater or equal to 38C (100.4F), Mild Pain (1 - 3)  aluminum hydroxide/magnesium hydroxide/simethicone Suspension 30 milliLiter(s) Oral every 4 hours PRN Dyspepsia  melatonin 3 milliGRAM(s) Oral at bedtime PRN Insomnia  ondansetron Injectable 4 milliGRAM(s) IV Push every 8 hours PRN Nausea and/or Vomiting    Allergies  No Known Allergies    FAMILY HISTORY:  Family history of prostate cancer (Sibling)    Review of Systems:  Constitutional, Eyes, ENT, Cardiovascular, Respiratory, Gastrointestinal, Genitourinary, Musculoskeletal, Integumentary, Neurological, Psychiatric, Endocrine, Heme/Lymph and Allergic/Immunologic review of systems are otherwise negative except as noted in HPI.     Vital Signs Last 24 Hrs  T(C): 37.1 (29 Sep 2022 02:46), Max: 37.2 (28 Sep 2022 15:49)  T(F): 98.7 (29 Sep 2022 02:46), Max: 99 (28 Sep 2022 15:49)  HR: 103 (29 Sep 2022 06:23) (82 - 130)  BP: 105/55 (29 Sep 2022 06:23) (104/78 - 136/79)  BP(mean): 80 (29 Sep 2022 01:49) (80 - 111)  RR: 18 (29 Sep 2022 02:46) (18 - 24)  SpO2: 98% (29 Sep 2022 02:46) (96% - 100%)    Parameters below as of 29 Sep 2022 02:46  Patient On (Oxygen Delivery Method): room air    Physical Exam  Eyes: no conjunctival infection, anicteric.   ENT: pharynx is unremarkable, moist mucus membrane, no oral lesions.   Neck: supple without JVD, no thyromegaly or masses appreciated.   Pulmonary: clear to auscultation bilaterally, no dullness, no wheezing.   Cardiac: RRR, normal S1S2, no murmurs, rubs, gallops.   Vascular: no JVD, no calf tenderness, venous stasis changes, varices.   Abdomen: normoactive bowel sounds, soft and nontender, no hepatosplenomegaly or masses appreciated.   Lymphatic: no peripheral adenopathy appreciated.   Musculoskeletal: full range of motion and no deformities appreciated.   Skin: normal appearance, no rash, nodules, vesicles, ulcers, erythema.   Neurology: grossly intact.   Psychiatric: affect appropriate.       LABS:  CBC Full  -  ( 29 Sep 2022 06:06 )  WBC Count : 12.60 K/uL  RBC Count : 4.35 M/uL  Hemoglobin : 14.3 g/dL  Hematocrit : 41.7 %  Platelet Count - Automated : 141 K/uL  Mean Cell Volume : 95.9 fl  Mean Cell Hemoglobin : 32.9 pg  Mean Cell Hemoglobin Concentration : 34.3 gm/dL  Auto Neutrophil # : x  Auto Lymphocyte # : x  Auto Monocyte # : x  Auto Eosinophil # : x  Auto Basophil # : x  Auto Neutrophil % : x  Auto Lymphocyte % : x  Auto Monocyte % : x  Auto Eosinophil % : x  Auto Basophil % : x        141  |  112<H>  |  23  ----------------------------<  133<H>  3.7   |  22  |  0.86    Ca    8.3<L>      29 Sep 2022 06:06  Phos  3.4       Mg     1.9         TPro  5.7<L>  /  Alb  3.0<L>  /  TBili  0.8  /  DBili  x   /  AST  8<L>  /  ALT  20  /  AlkPhos  57      PT/INR - ( 28 Sep 2022 16:36 )   PT: 14.3 sec;   INR: 1.23 ratio         PTT - ( 28 Sep 2022 16:36 )  PTT:32.4 sec  Urinalysis Basic - ( 28 Sep 2022 18:34 )    Color: Yellow / Appearance: Clear / S.015 / pH: x  Gluc: x / Ketone: Negative  / Bili: Negative / Urobili: Negative   Blood: x / Protein: 15 / Nitrite: Negative   Leuk Esterase: Trace / RBC: 3-5 /HPF / WBC 6-10   Sq Epi: x / Non Sq Epi: Occasional / Bacteria: Occasional    RADIOLOGY & ADDITIONAL STUDIES:    EXAM:  XR CHEST PORTABLE URGENT 1V                        PROCEDURE DATE:  2022    IMPRESSION:  1. Prominent left ventricle without CHF.  2. Linear atelectasis at the right lung base.  3. No acute abnormalities are demonstrated.     HPI:  83 y/o male with PMHx of non-Hodgkin's lymphoma (on Brukinsa 180 mg PO daily), hx of prostate Ca, Hypertension, Hyperlipidemia presents to the -ED for further evaluation and management of c/o midsternal chest pressure associated with palpitations. The patient states he noticed his heart rate to be in the 140s/min on his "Apple Watch" which prompted his visit to the ED. The patient states that he is visiting from [place] for his granddaughter's wedding. The patient admits to associated shortness of breath. Additionally notes an episode of hematuria yesterday. The patient denies any associated subjective fevers, chills, or cough.  Vital signs in triage => /66, /min, RR 23/min, TMax 98.8'F, SpO2 96% on room air. Labs => WBC 15.51, , INR 1.23, BUN/Cr 26/1.05, Glu 138, TnI (-) x 1, proBNP 2203, UA (+). In the ED the patient received Diltiazem 5mg IVP x 2, Diltiazem 30mg PO x 1, LMWH 90mg sq x 1, and NS 500mL x 1.   (29 Sep 2022 00:42)    PAST MEDICAL & SURGICAL HISTORY:  Inguinal hernia, bilateral  Dyslipidemia  Hypertension  History of prostate cancer    NHL (non-Hodgkin lymphoma)  History of knee replacement, total, bilateral-left , right   History of rotator cuff surgery-left   History of cataract extraction- bilateral   History of hernia repair    MEDICATIONS  (STANDING):  acyclovir   Oral Tab/Cap 400 milliGRAM(s) Oral two times a day  cefTRIAXone  IVPB      cefTRIAXone  IVPB 1000 milliGRAM(s) IV Intermittent once  diltiazem Tablet 30 milliGRAM(s) Oral every 6 hours  enoxaparin Injectable 90 milliGRAM(s) SubCutaneous every 12 hours  hydrochlorothiazide 12.5 milliGRAM(s) Oral daily  lisinopril 10 milliGRAM(s) Oral daily  tamsulosin 0.4 milliGRAM(s) Oral at bedtime    MEDICATIONS  (PRN):  acetaminophen     Tablet .. 650 milliGRAM(s) Oral every 6 hours PRN Temp greater or equal to 38C (100.4F), Mild Pain (1 - 3)  aluminum hydroxide/magnesium hydroxide/simethicone Suspension 30 milliLiter(s) Oral every 4 hours PRN Dyspepsia  melatonin 3 milliGRAM(s) Oral at bedtime PRN Insomnia  ondansetron Injectable 4 milliGRAM(s) IV Push every 8 hours PRN Nausea and/or Vomiting    Allergies  No Known Allergies    FAMILY HISTORY:  Family history of prostate cancer (Sibling)    Review of Systems:  Constitutional, Eyes, ENT, Cardiovascular, Respiratory, Gastrointestinal, Genitourinary, Musculoskeletal, Integumentary, Neurological, Psychiatric, Endocrine, Heme/Lymph and Allergic/Immunologic review of systems are otherwise negative except as noted in HPI.     Vital Signs Last 24 Hrs  T(C): 37.1 (29 Sep 2022 02:46), Max: 37.2 (28 Sep 2022 15:49)  T(F): 98.7 (29 Sep 2022 02:46), Max: 99 (28 Sep 2022 15:49)  HR: 103 (29 Sep 2022 06:23) (82 - 130)  BP: 105/55 (29 Sep 2022 06:23) (104/78 - 136/79)  BP(mean): 80 (29 Sep 2022 01:49) (80 - 111)  RR: 18 (29 Sep 2022 02:46) (18 - 24)  SpO2: 98% (29 Sep 2022 02:46) (96% - 100%)    Parameters below as of 29 Sep 2022 02:46  Patient On (Oxygen Delivery Method): room air    Physical Exam:  Eyes: no conjunctival infection, anicteric.   ENT: pharynx is unremarkable, moist mucus membrane, no oral lesions.   Neck: supple without JVD, no thyromegaly or masses appreciated.   Pulmonary: clear to auscultation bilaterally, no dullness, no wheezing.   Cardiac: RRR, normal S1S2, no murmurs, rubs, gallops.   Vascular: no JVD, no calf tenderness, venous stasis changes, varices.   Abdomen: normoactive bowel sounds, soft and nontender, no hepatosplenomegaly or masses appreciated.   Lymphatic: no peripheral adenopathy appreciated.   Musculoskeletal: full range of motion and no deformities appreciated.   Skin: normal appearance, no rash, nodules, vesicles, ulcers, erythema.   Neurology: grossly intact.   Psychiatric: affect appropriate.     LABS:  CBC Full  -  ( 29 Sep 2022 06:06 )  WBC Count : 12.60 K/uL  RBC Count : 4.35 M/uL  Hemoglobin : 14.3 g/dL  Hematocrit : 41.7 %  Platelet Count - Automated : 141 K/uL  Mean Cell Volume : 95.9 fl  Mean Cell Hemoglobin : 32.9 pg  Mean Cell Hemoglobin Concentration : 34.3 gm/dL  Auto Neutrophil # : x  Auto Lymphocyte # : x  Auto Monocyte # : x  Auto Eosinophil # : x  Auto Basophil # : x  Auto Neutrophil % : x  Auto Lymphocyte % : x  Auto Monocyte % : x  Auto Eosinophil % : x  Auto Basophil % : x        141  |  112<H>  |  23  ----------------------------<  133<H>  3.7   |  22  |  0.86    Ca    8.3<L>      29 Sep 2022 06:06  Phos  3.4       Mg     1.9         TPro  5.7<L>  /  Alb  3.0<L>  /  TBili  0.8  /  DBili  x   /  AST  8<L>  /  ALT  20  /  AlkPhos  57      PT/INR - ( 28 Sep 2022 16:36 )   PT: 14.3 sec;   INR: 1.23 ratio    PTT - ( 28 Sep 2022 16:36 )  PTT:32.4 sec    Urinalysis Basic - ( 28 Sep 2022 18:34 )  Color: Yellow / Appearance: Clear / S.015 / pH: x  Gluc: x / Ketone: Negative  / Bili: Negative / Urobili: Negative   Blood: x / Protein: 15 / Nitrite: Negative   Leuk Esterase: Trace / RBC: 3-5 /HPF / WBC 6-10   Sq Epi: x / Non Sq Epi: Occasional / Bacteria: Occasional    RADIOLOGY & ADDITIONAL STUDIES:    EXAM:  XR CHEST PORTABLE URGENT 1V                        PROCEDURE DATE:  2022    IMPRESSION:  1. Prominent left ventricle without CHF.  2. Linear atelectasis at the right lung base.  3. No acute abnormalities are demonstrated.     HPI:  83 y/o male with PMHx of non-Hodgkin's lymphoma (on Brukinsa 180 mg PO daily), hx of prostate Ca, Hypertension, Hyperlipidemia presents to the -ED for further evaluation and management of c/o midsternal chest pressure associated with palpitations. The patient states he noticed his heart rate to be in the 140s/min on his "Apple Watch" which prompted his visit to the ED. The patient states that he is visiting from [place] for his granddaughter's wedding. The patient admits to associated shortness of breath. Additionally notes an episode of hematuria yesterday. The patient denies any associated subjective fevers, chills, or cough.    Received patient in chair, alert and oriented x 4, in no acute distress. Reports follows an Oncologist Dr. Fermin Silver ([phone]) based in [place] for his NHL, dx' d in 10/2021 and is on Brukinsa since 3/2022. Stated has tolerated the medication well since and never had any cardiac arrythmias. [facility] was scheduled for a recommended cardiology appointment on 10/12/22 in [place]. Reports one episode of blood in urine, denies dysuria. Is followed by serial PSA at this annual visit for his hx of prostate CA (s/p RT in ).       PAST MEDICAL & SURGICAL HISTORY:  Inguinal hernia, bilateral  Dyslipidemia  Hypertension  History of prostate cancer    NHL (non-Hodgkin lymphoma)  History of knee replacement, total, bilateral-left , right   History of rotator cuff surgery-left   History of cataract extraction- bilateral   History of hernia repair    MEDICATIONS  (STANDING):  acyclovir   Oral Tab/Cap 400 milliGRAM(s) Oral two times a day  cefTRIAXone  IVPB      cefTRIAXone  IVPB 1000 milliGRAM(s) IV Intermittent once  diltiazem Tablet 30 milliGRAM(s) Oral every 6 hours  enoxaparin Injectable 90 milliGRAM(s) SubCutaneous every 12 hours  hydrochlorothiazide 12.5 milliGRAM(s) Oral daily  lisinopril 10 milliGRAM(s) Oral daily  tamsulosin 0.4 milliGRAM(s) Oral at bedtime    MEDICATIONS  (PRN):  acetaminophen     Tablet .. 650 milliGRAM(s) Oral every 6 hours PRN Temp greater or equal to 38C (100.4F), Mild Pain (1 - 3)  aluminum hydroxide/magnesium hydroxide/simethicone Suspension 30 milliLiter(s) Oral every 4 hours PRN Dyspepsia  melatonin 3 milliGRAM(s) Oral at bedtime PRN Insomnia  ondansetron Injectable 4 milliGRAM(s) IV Push every 8 hours PRN Nausea and/or Vomiting    Allergies  No Known Allergies    FAMILY HISTORY:  Family history of prostate cancer (Sibling)    Review of Systems:  Constitutional, Eyes, ENT, Cardiovascular, Respiratory, Gastrointestinal, Genitourinary, Musculoskeletal, Integumentary, Neurological, Psychiatric, Endocrine, Heme/Lymph and Allergic/Immunologic review of systems are otherwise negative except as noted in HPI.     Vital Signs Last 24 Hrs  T(C): 37.1 (29 Sep 2022 02:46), Max: 37.2 (28 Sep 2022 15:49)  T(F): 98.7 (29 Sep 2022 02:46), Max: 99 (28 Sep 2022 15:49)  HR: 103 (29 Sep 2022 06:23) (82 - 130)  BP: 105/55 (29 Sep 2022 06:23) (104/78 - 136/79)  BP(mean): 80 (29 Sep 2022 01:49) (80 - 111)  RR: 18 (29 Sep 2022 02:46) (18 - 24)  SpO2: 98% (29 Sep 2022 02:46) (96% - 100%)    Parameters below as of 29 Sep 2022 02:46  Patient On (Oxygen Delivery Method): room air    Physical Exam:  Eyes: no conjunctival infection, anicteric.   ENT: pharynx is unremarkable, moist mucus membrane, no oral lesions.   Neck: supple without JVD, no thyromegaly or masses appreciated.   Pulmonary: clear to auscultation bilaterally, no dullness, no wheezing.   Cardiac: RRR, normal S1S2, no murmurs, rubs, gallops.   Vascular: no JVD, no calf tenderness, venous stasis changes, varices.   Abdomen: normoactive bowel sounds, soft and nontender, no hepatosplenomegaly or masses appreciated.   Lymphatic: no peripheral adenopathy appreciated.   Musculoskeletal: full range of motion and no deformities appreciated.   Skin: normal appearance, no rash, nodules, vesicles, ulcers, erythema.   Neurology: grossly intact.   Psychiatric: affect appropriate.     LABS:  CBC Full  -  ( 29 Sep 2022 06:06 )  WBC Count : 12.60 K/uL  RBC Count : 4.35 M/uL  Hemoglobin : 14.3 g/dL  Hematocrit : 41.7 %  Platelet Count - Automated : 141 K/uL  Mean Cell Volume : 95.9 fl  Mean Cell Hemoglobin : 32.9 pg  Mean Cell Hemoglobin Concentration : 34.3 gm/dL  Auto Neutrophil # : x  Auto Lymphocyte # : x  Auto Monocyte # : x  Auto Eosinophil # : x  Auto Basophil # : x  Auto Neutrophil % : x  Auto Lymphocyte % : x  Auto Monocyte % : x  Auto Eosinophil % : x  Auto Basophil % : x        141  |  112<H>  |  23  ----------------------------<  133<H>  3.7   |  22  |  0.86    Ca    8.3<L>      29 Sep 2022 06:06  Phos  3.4       Mg     1.9         TPro  5.7<L>  /  Alb  3.0<L>  /  TBili  0.8  /  DBili  x   /  AST  8<L>  /  ALT  20  /  AlkPhos  57      PT/INR - ( 28 Sep 2022 16:36 )   PT: 14.3 sec;   INR: 1.23 ratio    PTT - ( 28 Sep 2022 16:36 )  PTT:32.4 sec    Urinalysis Basic - ( 28 Sep 2022 18:34 )  Color: Yellow / Appearance: Clear / S.015 / pH: x  Gluc: x / Ketone: Negative  / Bili: Negative / Urobili: Negative   Blood: x / Protein: 15 / Nitrite: Negative   Leuk Esterase: Trace / RBC: 3-5 /HPF / WBC 6-10   Sq Epi: x / Non Sq Epi: Occasional / Bacteria: Occasional    RADIOLOGY & ADDITIONAL STUDIES:    EXAM:  XR CHEST PORTABLE URGENT 1V                        PROCEDURE DATE:  2022    IMPRESSION:  1. Prominent left ventricle without CHF.  2. Linear atelectasis at the right lung base.  3. No acute abnormalities are demonstrated.     HPI:  81 y/o male with PMHx of non-Hodgkin's lymphoma (on Brukinsa/ Zanubrutinib 160 mg PO daily), hx of prostate Ca, Hypertension, Hyperlipidemia presents to the -ED for further evaluation and management of c/o midsternal chest pressure associated with palpitations. The patient states he noticed his heart rate to be in the 140s/min on his "Apple Watch" which prompted his visit to the ED. The patient states that he is visiting from [place] for his granddaughter's wedding. The patient admits to associated shortness of breath. Additionally notes an episode of hematuria yesterday. The patient denies any associated subjective fevers, chills, or cough.    Received patient in chair, alert and oriented x 4, in no acute distress. Reports follows an Oncologist Dr. Fermin Silver ([phone]) based in [place] for his NHL, dx' d in 10/2021 and is on Brukinsa since 3/2022. Stated has tolerated the medication well since and never had any cardiac arrhythmias. [facility] was scheduled for a recommended cardiology appointment on 10/12/22 in [place]. Reports one episode of blood in urine, denies dysuria. Is followed by serial PSA at this annual visit for his hx of prostate CA (s/p RT in ).       PAST MEDICAL & SURGICAL HISTORY:  Inguinal hernia, bilateral  Dyslipidemia  Hypertension  History of prostate cancer    NHL (non-Hodgkin lymphoma)  History of knee replacement, total, bilateral-left , right   History of rotator cuff surgery-left   History of cataract extraction- bilateral   History of hernia repair    MEDICATIONS  (STANDING):  acyclovir   Oral Tab/Cap 400 milliGRAM(s) Oral two times a day  cefTRIAXone  IVPB      cefTRIAXone  IVPB 1000 milliGRAM(s) IV Intermittent once  diltiazem Tablet 30 milliGRAM(s) Oral every 6 hours  enoxaparin Injectable 90 milliGRAM(s) SubCutaneous every 12 hours  hydrochlorothiazide 12.5 milliGRAM(s) Oral daily  lisinopril 10 milliGRAM(s) Oral daily  tamsulosin 0.4 milliGRAM(s) Oral at bedtime    MEDICATIONS  (PRN):  acetaminophen     Tablet .. 650 milliGRAM(s) Oral every 6 hours PRN Temp greater or equal to 38C (100.4F), Mild Pain (1 - 3)  aluminum hydroxide/magnesium hydroxide/simethicone Suspension 30 milliLiter(s) Oral every 4 hours PRN Dyspepsia  melatonin 3 milliGRAM(s) Oral at bedtime PRN Insomnia  ondansetron Injectable 4 milliGRAM(s) IV Push every 8 hours PRN Nausea and/or Vomiting    Allergies  No Known Allergies    FAMILY HISTORY:  Family history of prostate cancer (Sibling)    Review of Systems:  Constitutional, Eyes, ENT, Cardiovascular, Respiratory, Gastrointestinal, Genitourinary, Musculoskeletal, Integumentary, Neurological, Psychiatric, Endocrine, Heme/Lymph and Allergic/Immunologic review of systems are otherwise negative except as noted in HPI.     Vital Signs Last 24 Hrs  T(C): 37.1 (29 Sep 2022 02:46), Max: 37.2 (28 Sep 2022 15:49)  T(F): 98.7 (29 Sep 2022 02:46), Max: 99 (28 Sep 2022 15:49)  HR: 103 (29 Sep 2022 06:23) (82 - 130)  BP: 105/55 (29 Sep 2022 06:23) (104/78 - 136/79)  BP(mean): 80 (29 Sep 2022 01:49) (80 - 111)  RR: 18 (29 Sep 2022 02:46) (18 - 24)  SpO2: 98% (29 Sep 2022 02:46) (96% - 100%)    Parameters below as of 29 Sep 2022 02:46  Patient On (Oxygen Delivery Method): room air    Physical Exam:  Eyes: no conjunctival infection, anicteric.   ENT: pharynx is unremarkable, moist mucus membrane, no oral lesions.   Neck: supple without JVD, no thyromegaly or masses appreciated.   Pulmonary: clear to auscultation bilaterally, no dullness, no wheezing.   Cardiac: RRR, normal S1S2, no murmurs, rubs, gallops.   Vascular: no JVD, no calf tenderness, venous stasis changes, varices.   Abdomen: normoactive bowel sounds, soft and nontender, no hepatosplenomegaly or masses appreciated.   Lymphatic: no peripheral adenopathy appreciated.   Musculoskeletal: full range of motion and no deformities appreciated.   Skin: normal appearance, no rash, nodules, vesicles, ulcers, erythema.   Neurology: grossly intact.   Psychiatric: affect appropriate.     LABS:  CBC Full  -  ( 29 Sep 2022 06:06 )  WBC Count : 12.60 K/uL  RBC Count : 4.35 M/uL  Hemoglobin : 14.3 g/dL  Hematocrit : 41.7 %  Platelet Count - Automated : 141 K/uL  Mean Cell Volume : 95.9 fl  Mean Cell Hemoglobin : 32.9 pg  Mean Cell Hemoglobin Concentration : 34.3 gm/dL  Auto Neutrophil # : x  Auto Lymphocyte # : x  Auto Monocyte # : x  Auto Eosinophil # : x  Auto Basophil # : x  Auto Neutrophil % : x  Auto Lymphocyte % : x  Auto Monocyte % : x  Auto Eosinophil % : x  Auto Basophil % : x        141  |  112<H>  |  23  ----------------------------<  133<H>  3.7   |  22  |  0.86    Ca    8.3<L>      29 Sep 2022 06:06  Phos  3.4       Mg     1.9         TPro  5.7<L>  /  Alb  3.0<L>  /  TBili  0.8  /  DBili  x   /  AST  8<L>  /  ALT  20  /  AlkPhos  57      PT/INR - ( 28 Sep 2022 16:36 )   PT: 14.3 sec;   INR: 1.23 ratio    PTT - ( 28 Sep 2022 16:36 )  PTT:32.4 sec    Urinalysis Basic - ( 28 Sep 2022 18:34 )  Color: Yellow / Appearance: Clear / S.015 / pH: x  Gluc: x / Ketone: Negative  / Bili: Negative / Urobili: Negative   Blood: x / Protein: 15 / Nitrite: Negative   Leuk Esterase: Trace / RBC: 3-5 /HPF / WBC 6-10   Sq Epi: x / Non Sq Epi: Occasional / Bacteria: Occasional    RADIOLOGY & ADDITIONAL STUDIES:    EXAM:  XR CHEST PORTABLE URGENT 1V                        PROCEDURE DATE:  2022    IMPRESSION:  1. Prominent left ventricle without CHF.  2. Linear atelectasis at the right lung base.  3. No acute abnormalities are demonstrated.

## 2022-09-29 NOTE — H&P ADULT - NSICDXPASTMEDICALHX_GEN_ALL_CORE_FT
PAST MEDICAL HISTORY:  Dyslipidemia     History of prostate cancer 2012    Hypertension     Inguinal hernia, bilateral      PAST MEDICAL HISTORY:  Dyslipidemia     History of prostate cancer 2012    Hypertension     Inguinal hernia, bilateral     NHL (non-Hodgkin's lymphoma)

## 2022-09-29 NOTE — H&P ADULT - HISTORY OF PRESENT ILLNESS
83 y/o male with PMHx of non-Hodgkin's lymphoma (on chemo), hx of prostate Ca, Hypertension, anf Hyperlipidemia presents to the [facility] for further evaluation and management of c/o midsternal chest pressure associated with palpitations. The patient states he noticed his heart rate to be in the 140s/min on his "Apple Watch" which prompted his visit to the [facility]. The patient states that he is visiting from [place] for his granddaughter's wedding. The patient admits to associated shortness of breath. Additionally notes an episode of hematuria yesterday. The patient denies any associated subjective fevers, chills, or cough.  Vital signs in triage => /66, /min, RR 23/min, TMax 98.8'F, SpO2 96% on room air. Labs => WBC 15.51, , INR 1.23, BUN/Cr 26/1.05, Glu 138, TnI (-) x 1, proBNP 2203, UA (+). In the ED the patient received Diltiazem 5mg IVP x 2, Diltiazem 30mg PO x 1, LMWH 90mg sq x 1, and NS 500mL x 1.

## 2022-09-29 NOTE — H&P ADULT - ASSESSMENT
83 y/o male with PMHx of non-Hodgkin's lymphoma (on chemo), hx of prostate Ca, Hypertension, anf Hyperlipidemia presents to the [facility] for further evaluation and management of c/o midsternal chest pressure associated with palpitations. The patient states he noticed his heart rate to be in the 140s/min on his "Apple Watch" which prompted his visit to the [facility]. The patient states that he is visiting from [place] for his granddaughter's wedding. The patient admits to associated shortness of breath. Additionally notes an episode of hematuria yesterday. The patient denies any associated subjective fevers, chills, or cough.  Vital signs in triage => /66, /min, RR 23/min, TMax 98.8'F, SpO2 96% on room air. Labs => WBC 15.51, , INR 1.23, BUN/Cr 26/1.05, Glu 138, TnI (-) x 1, proBNP 2203, UA (+). In the ED the patient received Diltiazem 5mg IVP x 2, Diltiazem 30mg PO x 1, LMWH 90mg sq x 1, and NS 500mL x 1. 81 y/o male with PMHx of non-Hodgkin's lymphoma (on chemo), hx of prostate Ca, Hypertension, anf Hyperlipidemia presents to the [facility] for further evaluation and management of c/o midsternal chest pressure associated with palpitations. The patient states he noticed his heart rate to be in the 140s/min on his "Apple Watch" which prompted his visit to the [facility]. The patient states that he is visiting from [place] for his granddaughter's wedding. The patient admits to associated shortness of breath. Additionally notes an episode of hematuria yesterday. The patient denies any associated subjective fevers, chills, or cough.  Vital signs in triage => /66, /min, RR 23/min, TMax 98.8'F, SpO2 96% on room air. Labs => WBC 15.51, , INR 1.23, BUN/Cr 26/1.05, Glu 138, TnI (-) x 1, proBNP 2203, UA (+). In the ED the patient received Diltiazem 5mg IVP x 2, Diltiazem 30mg PO x 1, LMWH 90mg sq x 1, and NS 500mL x 1.    #Acute onset of Chest pain and New Onset Atrial Fibrillation with Rapid Ventricular Response  ~admit to Telemetry  ~serial Kofi/EKGs  ~NPO after MN  ~cont. rate control  ~patient given LMWH 1mg/kg sc x 1 in the ED  ~will cont. LMWH for now  ~f/u 2DECHO in the am  ~reviewed pro-BNP = 2203  ~f/u w/ Cardiology in the am    #UTI   ~f/u PAN C+S  ~cont. IV abx    #Non-Hodgkin's Lymphoma  ~on Brukinsa 160mg po daily  ~f/u w/ Hematology/Oncology in the am  ~cont. Acyclovir 400mg po bid    #BPH  ~cont. Tamsulosin 0.4mg po daily    #Hypertension  ~cont. Lisinopril 10mf po daily  ~cont. HCTZ 12.5mg po daily    #Vte ppx  ~cont. LMWH sq

## 2022-09-29 NOTE — CHART NOTE - NSCHARTNOTEFT_GEN_A_CORE
TRANSESOPHAGEAL ECHOCARDIOGRAPHY AND ELECTRICAL CARDIOVERSION  PROCEDURE NOTE    Indication: Atrial fibrillation    Sedation:   propofol, see anesthesiology note for details.    Findings:  No evidence of left atrial appendage thrombus.  The left ventricle is normal in size, thickness & systolic function. Ejection fraction (EF)=55-60%.  Ascending aorta is mildly dilated at 4.4 cm.  Mild mitral valve thickening.  After completion of transesophageal echocardiography study  patient was cardioverted at 200 Joules & successfully converted to normal sinus rhythm after 1 attempt.    Complications: None

## 2022-09-29 NOTE — CONSULT NOTE ADULT - ASSESSMENT
81 y/o male with PM-Hx of non-Hodgkin lymphoma, on Brukinsa, also with distant hx of Prostate CA presents to the ED c/o chest discomfort, admitted for new onset of A-Fib. Consulted for ........    # Non-Hodgkin Lymphoma  -On Brukinsa 18 mg Po daily  -cont. Acyclovir 400mg po bid    #BPH  -cont. Tamsulosin 0.4mg PO daily    # Acute onset of Chest pain and New Onset Atrial Fibrillation with Rapid Ventricular Response  -on Telemetry  -planned ECHO  -follow Cardiology    #UTI   -f/u PAN C+S  -cont. IV Abx         81 y/o male with PM-Hx of non-Hodgkin lymphoma, on Brukinsa, also with distant hx of Prostate CA presents to the ED c/o chest discomfort, admitted for new onset of A-Fib. Consulted for ........    # Non-Hodgkin Lymphoma  -On Brukinsa 18 mg Po daily- Hold while in hospital and pending cardiovascular eval (Up To date reports -Hypertension (12% to 14%), peripheral edema (12%)  -cont. Acyclovir 400mg po bid    #BPH  -cont. Tamsulosin 0.4mg PO daily    # Acute onset of Chest pain and New Onset Atrial Fibrillation with Rapid Ventricular Response  -on Telemetry  -planned ECHO  -follow Cardiology    #UTI   -f/u PAN C+S  -cont. IV Abx         81 y/o male with PM-Hx of non-Hodgkin lymphoma, on Brukinsa, also with distant hx of Prostate CA presents to the ED c/o chest discomfort, admitted for new onset of A-Fib. Consulted for ........    # Non-Hodgkin Lymphoma  -On Brukinsa 18 mg Po daily- Hold while in hospital and pending cardiovascular eval (Up To date reports -Hypertension (12% to 14%), peripheral edema (12%)  -cont. Acyclovir 400mg po bid    #BPH  -cont Tamsulosin 0.4mg PO daily    # Acute onset of Chest pain and New Onset Atrial Fibrillation with Rapid Ventricular Response  -on Telemetry  -planned ECHO  -follow Cardiology  -To r/o any active bleeding/CNS hemorrhage before considering to start anti-coagulant.    #UTI   -f/u PAN C+S  -cont. IV Abx         83 y/o male with PMH of non-Hodgkins lymphoma, on Brukinsa, also with distant hx of Prostate CA, presents to the ED with c/o chest discomfort, admitted for new onset of A-Fib.     # Non-Hodgkin Lymphoma/ Marginal Zone  -I spoke with patient's hematologist Dr. Silver in [place] [cell phone [phone]].  Patient has marginal zone lymphoma.    -Treatment was initiated for severe thrombocytopenia and splenomegaly.  He had a severe reaction to Rituxan.  Since that time he has responded very well to Brukinsa.      # Acute onset of Chest pain/ New Onset Atrial Fibrillation with Rapid Ventricular Response  -In terms of risk factors for atrial fibrillation, it is known that Brukinsa can cause atrial fibrillation and is in the package insert.  More often atrial fibrillation is seen with ibrutinib.    -Although he has other risk factors for atrial fibrillation, both Dr. Silver and I feel that given his partial remission, it would be prudent to hold Brukinsa at this time.    -Dr. Silver spoke with Dr. Rosenthal regarding plan for possible cardioversion/ rate control with anticoagulation.    -Dr. Hester will consider rechallenging the patient in the future with Brukinsa should this be necessary  -on Telemetry  -planned ECHO  -follow Cardiology    #UTI   -f/u PAN C+S  -cont. IV Abx

## 2022-09-30 ENCOUNTER — RESULT REVIEW (OUTPATIENT)
Age: 82
End: 2022-09-30

## 2022-09-30 ENCOUNTER — TRANSCRIPTION ENCOUNTER (OUTPATIENT)
Age: 82
End: 2022-09-30

## 2022-09-30 VITALS
DIASTOLIC BLOOD PRESSURE: 63 MMHG | HEART RATE: 74 BPM | RESPIRATION RATE: 18 BRPM | TEMPERATURE: 98 F | SYSTOLIC BLOOD PRESSURE: 96 MMHG | OXYGEN SATURATION: 100 %

## 2022-09-30 PROBLEM — C85.90 NON-HODGKIN LYMPHOMA, UNSPECIFIED, UNSPECIFIED SITE: Chronic | Status: ACTIVE | Noted: 2022-09-29

## 2022-09-30 PROCEDURE — 76770 US EXAM ABDO BACK WALL COMP: CPT | Mod: 26

## 2022-09-30 PROCEDURE — 99239 HOSP IP/OBS DSCHRG MGMT >30: CPT

## 2022-09-30 PROCEDURE — 88108 CYTOPATH CONCENTRATE TECH: CPT | Mod: 26

## 2022-09-30 PROCEDURE — 99232 SBSQ HOSP IP/OBS MODERATE 35: CPT

## 2022-09-30 RX ORDER — CEFUROXIME AXETIL 250 MG
1 TABLET ORAL
Qty: 6 | Refills: 0
Start: 2022-09-30 | End: 2022-10-02

## 2022-09-30 RX ORDER — METOPROLOL TARTRATE 50 MG
1 TABLET ORAL
Qty: 60 | Refills: 0
Start: 2022-09-30

## 2022-09-30 RX ORDER — LISINOPRIL/HYDROCHLOROTHIAZIDE 10-12.5 MG
1 TABLET ORAL
Qty: 0 | Refills: 0 | DISCHARGE

## 2022-09-30 RX ORDER — APIXABAN 2.5 MG/1
TABLET, FILM COATED ORAL
Refills: 0 | Status: DISCONTINUED | OUTPATIENT
Start: 2022-09-30 | End: 2022-09-30

## 2022-09-30 RX ORDER — APIXABAN 2.5 MG/1
5 TABLET, FILM COATED ORAL ONCE
Refills: 0 | Status: COMPLETED | OUTPATIENT
Start: 2022-09-30 | End: 2022-09-30

## 2022-09-30 RX ORDER — LISINOPRIL 2.5 MG/1
5 TABLET ORAL DAILY
Refills: 0 | Status: DISCONTINUED | OUTPATIENT
Start: 2022-09-30 | End: 2022-09-30

## 2022-09-30 RX ORDER — APIXABAN 2.5 MG/1
5 TABLET, FILM COATED ORAL EVERY 12 HOURS
Refills: 0 | Status: DISCONTINUED | OUTPATIENT
Start: 2022-09-30 | End: 2022-09-30

## 2022-09-30 RX ORDER — ZANUBRUTINIB 80 MG/1
2 CAPSULE, GELATIN COATED ORAL
Qty: 0 | Refills: 0 | DISCHARGE

## 2022-09-30 RX ORDER — APIXABAN 2.5 MG/1
1 TABLET, FILM COATED ORAL
Qty: 60 | Refills: 0
Start: 2022-09-30

## 2022-09-30 RX ADMIN — Medication 25 MILLIGRAM(S): at 10:17

## 2022-09-30 RX ADMIN — APIXABAN 5 MILLIGRAM(S): 2.5 TABLET, FILM COATED ORAL at 10:17

## 2022-09-30 RX ADMIN — Medication 400 MILLIGRAM(S): at 05:22

## 2022-09-30 NOTE — DISCHARGE NOTE PROVIDER - CARE PROVIDER_API CALL
Melo Huizar  [address]  [address]  [facility] [address]  Phone: [phone]  Fax: (   )    -  Follow Up Time: 2 weeks    Rick Hawkins  [address] [address]  Phone: [phone]  Fax: (   )    -  Follow Up Time: 2 weeks

## 2022-09-30 NOTE — PHARMACOTHERAPY INTERVENTION NOTE - COMMENTS
New medications reviewed w/ patient, patient provided w/ educational materials.  All questions answered 
as per [facility] pharmacy co-pay for one month supply of Eliquis $28
Patient on Brukinsa as an outpatient; per package insert Concomitant use of Brukinsa (a CYP3A substrate) and a CYP3A inhibitor (diltiazem) increases Brukinsa exposure, which may increase the risk of Brukinsa toxicities.  Recommended d/c Diltiazem and initiating a Beta-block to avoid DDI 
Medication history complete. Medications and allergies reviewed with patient and confirmed with . Patient states that he will supply Brukinsa 80mg. Patient's daughter, Marleen Gaxiola ([phone]) will bring in the medication.

## 2022-09-30 NOTE — PROGRESS NOTE ADULT - ASSESSMENT
83 y/o male with PMH of non-Hodgkins lymphoma, on Brukinsa, also with distant hx of Prostate CA, presents to the ED with c/o chest discomfort, admitted for new onset of A-Fib.     # Non-Hodgkin Lymphoma/ Marginal Zone  -I spoke with patient's hematologist Dr. Silver in [place] [cell phone [phone]].  Patient has marginal zone lymphoma.    -Treatment was initiated for severe thrombocytopenia and splenomegaly.  He had a severe reaction to Rituxan.  Since that time he has responded very well to Brukinsa.      # Acute onset of Chest pain/ New Onset Atrial Fibrillation with Rapid Ventricular Response  -In terms of risk factors for atrial fibrillation, it is known that Brukinsa can cause atrial fibrillation and is in the package insert.  More often atrial fibrillation is seen with ibrutinib.    -Although he has other risk factors for atrial fibrillation, both Dr. Silver and I feel that given his partial remission, it would be prudent to hold Brukinsa at this time.    -Dr. Silver spoke with Dr. Rosenthal regarding plan for possible cardioversion/ rate control with anticoagulation.    -Dr. Hester will consider rechallenging the patient in the future with Brukinsa should this be necessary  -on Telemetry  -planned ECHO  -follow Cardiology  -Follow Dr. Silver when back to [place].    #UTI   -f/u PAN C+S  -cont. IV Abx

## 2022-09-30 NOTE — DISCHARGE NOTE NURSING/CASE MANAGEMENT/SOCIAL WORK - PATIENT PORTAL LINK FT
You can access the FollowMyHealth Patient Portal offered by [facility] by registering at the following website: [email or web address]. By joining [employer]’s FollowMyHealth portal, you will also be able to view your health information using other applications (apps) compatible with our system.

## 2022-09-30 NOTE — DISCHARGE NOTE PROVIDER - PROVIDER TOKENS
FREE:[LAST:[Gaye],FIRST:[Melo],PHONE:[phone]],FAX:[(   )    -],ADDRESS:[[address]  [address]  [place] [address]],FOLLOWUP:[2 weeks]],FREE:[LAST:[Shruthi],FIRST:[Rick],PHONE:[phone]],FAX:[(   )    -],ADDRESS:[[address] [place] [address]],FOLLOWUP:[2 weeks]]

## 2022-09-30 NOTE — PROGRESS NOTE ADULT - SUBJECTIVE AND OBJECTIVE BOX
HPI:  81 y/o male with PMHx of non-Hodgkin's lymphoma (on Brukinsa/ Zanubrutinib 160 mg PO daily), hx of prostate Ca, Hypertension, Hyperlipidemia presents to the -ED for further evaluation and management of c/o midsternal chest pressure associated with palpitations. The patient states he noticed his heart rate to be in the 140s/min on his "Apple Watch" which prompted his visit to the ED. The patient states that he is visiting from [place] for his granddaughter's wedding. The patient admits to associated shortness of breath. Additionally notes an episode of hematuria yesterday. The patient denies any associated subjective fevers, chills, or cough.    Received patient in chair, alert and oriented x 4, in no acute distress. Reports follows an Oncologist Dr. Fermin Silver ([phone]) based in [place] for his NHL, dx' d in 10/2021 and is on Brukinsa since 3/2022. Stated has tolerated the medication well since and never had any cardiac arrhythmias. \[facility] [place] [facility]\"" was scheduled for a recommended cardiology appointment on 10/12/22 in [place]. Reports one episode of blood in urine, denies dysuria. Is followed by serial PSA at this annual visit for his hx of prostate CA (s/p RT in ).    2022- Received in bed, sitting, eating lunch. Reports feeling well, happy to go home today. aware to follow up with his oncologist when he returns to [place] after the wedding in the family this weekend.     PAST MEDICAL & SURGICAL HISTORY:  Inguinal hernia, bilateral  Dyslipidemia  Hypertension  History of prostate cancer    NHL (non-Hodgkin lymphoma)  History of knee replacement, total, bilateral-left , right   History of rotator cuff surgery-left   History of cataract extraction- bilateral   History of hernia repair    MEDICATIONS  (STANDING):  acyclovir   Oral Tab/Cap 400 milliGRAM(s) Oral two times a day  cefTRIAXone  IVPB      cefTRIAXone  IVPB 1000 milliGRAM(s) IV Intermittent once  diltiazem Tablet 30 milliGRAM(s) Oral every 6 hours  enoxaparin Injectable 90 milliGRAM(s) SubCutaneous every 12 hours  hydrochlorothiazide 12.5 milliGRAM(s) Oral daily  lisinopril 10 milliGRAM(s) Oral daily  tamsulosin 0.4 milliGRAM(s) Oral at bedtime    MEDICATIONS  (PRN):  acetaminophen     Tablet .. 650 milliGRAM(s) Oral every 6 hours PRN Temp greater or equal to 38C (100.4F), Mild Pain (1 - 3)  aluminum hydroxide/magnesium hydroxide/simethicone Suspension 30 milliLiter(s) Oral every 4 hours PRN Dyspepsia  melatonin 3 milliGRAM(s) Oral at bedtime PRN Insomnia  ondansetron Injectable 4 milliGRAM(s) IV Push every 8 hours PRN Nausea and/or Vomiting    Allergies  No Known Allergies    FAMILY HISTORY:  Family history of prostate cancer (Sibling)    Review of Systems:  Constitutional, Eyes, ENT, Cardiovascular, Respiratory, Gastrointestinal, Genitourinary, Musculoskeletal, Integumentary, Neurological, Psychiatric, Endocrine, Heme/Lymph and Allergic/Immunologic review of systems are otherwise negative except as noted in HPI.     Vital Signs Last 24 Hrs  Vital Signs Last 24 Hrs  T(C): 36.7 (30 Sep 2022 07:19), Max: 37 (29 Sep 2022 21:10)  T(F): 98 (30 Sep 2022 07:19), Max: 98.6 (29 Sep 2022 21:10)  HR: 74 (30 Sep 2022 07:19) (58 - 115)  BP: 96/63 (30 Sep 2022 07:19) (79/55 - 118/87)  BP(mean): 70 (30 Sep 2022 07:19) (70 - 70)  RR: 18 (30 Sep 2022 07:19) (16 - 18)  SpO2: 100% (30 Sep 2022 07:19) (97% - 100%)    Parameters below as of 30 Sep 2022 07:19  Patient On (Oxygen Delivery Method): room air    Physical Exam:  Eyes: no conjunctival infection, anicteric.   ENT: pharynx is unremarkable, moist mucus membrane, no oral lesions.   Neck: supple without JVD, no thyromegaly or masses appreciated.   Pulmonary: clear to auscultation bilaterally, no dullness, no wheezing.   Cardiac: RRR, normal S1S2, no murmurs, rubs, gallops.   Vascular: no JVD, no calf tenderness, venous stasis changes, varices.   Abdomen: normoactive bowel sounds, soft and nontender, no hepatosplenomegaly or masses appreciated.   Lymphatic: no peripheral adenopathy appreciated.   Musculoskeletal: full range of motion and no deformities appreciated.   Skin: normal appearance, no rash, nodules, vesicles, ulcers, erythema.   Neurology: grossly intact.   Psychiatric: affect appropriate.     LABS:  CBC Full  -  ( 29 Sep 2022 06:06 )  WBC Count : 12.60 K/uL  RBC Count : 4.35 M/uL  Hemoglobin : 14.3 g/dL  Hematocrit : 41.7 %  Platelet Count - Automated : 141 K/uL  Mean Cell Volume : 95.9 fl  Mean Cell Hemoglobin : 32.9 pg  Mean Cell Hemoglobin Concentration : 34.3 gm/dL  Auto Neutrophil # : 4.54 K/uL  Auto Lymphocyte # : 6.05 K/uL  Auto Monocyte # : 0.76 K/uL  Auto Eosinophil # : 0.13 K/uL  Auto Basophil # : 0.00 K/uL  Auto Neutrophil % : 36.0 %  Auto Lymphocyte % : 48.0 %  Auto Monocyte % : 6.0 %  Auto Eosinophil % : 1.0 %  Auto Basophil % : 0.0 %        141  |  112<H>  |  23  ----------------------------<  133<H>  3.7   |  22  |  0.86    Ca    8.3<L>      29 Sep 2022 06:06  Phos  3.4       Mg     1.9         TPro  5.7<L>  /  Alb  3.0<L>  /  TBili  0.8  /  DBili  x   /  AST  8<L>  /  ALT  20  /  AlkPhos  57      PT/INR - ( 28 Sep 2022 16:36 )   PT: 14.3 sec;   INR: 1.23 ratio    PTT - ( 28 Sep 2022 16:36 )  PTT:32.4 sec    Urinalysis Basic - ( 28 Sep 2022 18:34 )  Color: Yellow / Appearance: Clear / S.015 / pH: x  Gluc: x / Ketone: Negative  / Bili: Negative / Urobili: Negative   Blood: x / Protein: 15 / Nitrite: Negative   Leuk Esterase: Trace / RBC: 3-5 /HPF / WBC 6-10   Sq Epi: x / Non Sq Epi: Occasional / Bacteria: Occasional    RADIOLOGY & ADDITIONAL STUDIES:    EXAM:  XR CHEST PORTABLE URGENT 1V                        PROCEDURE DATE:  2022    IMPRESSION:  1. Prominent left ventricle without CHF.  2. Linear atelectasis at the right lung base.  3. No acute abnormalities are demonstrated.

## 2022-09-30 NOTE — DISCHARGE NOTE PROVIDER - HOSPITAL COURSE
83 yo man with HTN, HLD, NHL on Brukinsa, hx of prostate cancer s/p RT, presented for further evaluation of midsternal chest pressure associated with palpitations, heart rate in the 140s/min on his Apple Watch. On ROS he reported associated shortness of breath. Additionally, he noted an episode of hematuria the day prior to presentation. Some urinary frequency but no fevers, chills, suprapubic pain or flank pain. In triage /66, /min, RR 23/min, TMax 98.8'F, SpO2 96% on room air. Labs WBC 15.51, , INR 1.23, BUN/Cr 26/1.05, Glu 138, TnI (-) x 1, proBNP 2203, UA (+). In the ED the patient received Diltiazem 5mg IVP x 2, Diltiazem 30mg PO x 1, LMWH 90mg sq x 1, and NS 500mL x 1, continued on PO Diltiazem and LMWH for the afib and ceftriaxone for the possible UTI.    Rapid afib  Appreciate input from Cardiology. Patient switched over to metoprolol as per clinical pharmacist due to diltiazem's interaction with patient's Brukinsa. Patient underwent MANNIE CV in the afternoon which resulted in return to NSR, rate WNL. Continued on low dose metoprolol (in place of his lisinopril HCTZ). Good effect. Good HR control and BP control at this time. TSH WNL. Trop WNL. Echo without signs of myocardial ischemia. Eliquis for stroke risk reduction. Stable for discharge home.     Unable to rule out UTI  UA+. On ceftriaxone empirically. For completeness given the isolated episode of hematuria prior to admission, sent off urine cystology and obtained renal bladder US. No significant findings. Continue Ceftin 500mg BID for 5 more days.    NHL  Holding off on the Brukinsa until patient follows up with his oncologist given the possibility albeit remote that the medication contributed to his afib with RVR    Discharge Exam:  T(F): 98 (30 Sep 2022 07:19), Max: 98.6 (29 Sep 2022 21:10)  HR: 74 (30 Sep 2022 07:19) (58 - 126)  BP: 96/63 (30 Sep 2022 07:19) (79/55 - 125/85)  RR: 18 (30 Sep 2022 07:19) (16 - 18)  SpO2: 100% (30 Sep 2022 07:19) (97% - 100%) on room air    Gen: Comfortable  HEENT: NCAT PERRL EOMI MMM  Neck: Supple, no JVD  Chest: CTA B/L  CVS: s1 s2 normal RRR  Abd: +Bs, soft NT ND   Ext: No edema or calf tenderness  Skin: Warm, dry  Neuro A+OX3, no deficits  Mood: Calm, pleasant    Labs:                        14.3   12.60 )-------( 141                   41.7       141  |  112  |  23  ----------------------<  133  3.7   |   22  |  0.86    Ca 8.3   Phos 3.4    Mg 2.0    TPro  5.7 /  Alb  3.0  /  TBili  0.8  /  DBili  x   /  AST  8  /  ALT  20  /  AlkPhos  57      Urinalysis: Clear, ket-, prot 15, N-, LE trace, RBC 3-5, WBC 6-10, bact occ    COVID19 PCR negative  Urine culture in process  Blood culture x 2 no growth to date    Imaging:  CXR 9/28: Prominent left ventricle without CHF. Linear atelectasis at the right lung base. No acute abnormalities are demonstrated.    Cardiac Testing / Procedures:  Tele: NSR, rate WNL    MANNIE/CV 9/29: No evidence of left atrial appendage thrombus. The left ventricle is normal in size, thickness & systolic function. Ejection fraction (EF)=55-60%. Ascending aorta is mildly dilated at 4.4 cm. Mild mitral valve thickening. After completion of transesophageal echocardiography study, patient was cardioverted at 200 Joules & successfully converted to normal sinus rhythm after 1 attempt.    TTE 9/29:  There is calcification of both mitral valve leaflets. Trace mitral regurgitation is present. The ascending aorta is dilated. The aortic valve appears calcified. The aortic valve function appears normal. Normal appearing tricuspid valve structure. Mild (1+) tricuspid valve regurgitation is present. Pulmonic valve not well seen, probably normal pulmonic valve function. Normal appearing left atrium. The left ventricle is normal in size, wall thickness, wall motion and contractility. Estimated left ventricular ejection fraction is 55-60 %.    EKG 9/28: Afib with RVR

## 2022-09-30 NOTE — DISCHARGE NOTE PROVIDER - NSDCMRMEDTOKEN_GEN_ALL_CORE_FT
acyclovir 400 mg oral tablet: 1 tab(s) orally 2 times a day  apixaban 5 mg oral tablet: 1 tab(s) orally 2 times a day   cefuroxime 500 mg oral tablet: 1 tab(s) orally 2 times a day, take for 3 more days and then stop  metoprolol tartrate 25 mg oral tablet: 1 tab(s) orally 2 times a day  Multiple Vitamins oral tablet: 1 tab(s) orally once a day  tamsulosin 0.4 mg oral capsule: 1 cap(s) orally once a day (in the evening)

## 2022-09-30 NOTE — DISCHARGE NOTE NURSING/CASE MANAGEMENT/SOCIAL WORK - NSDCPEFALRISK_GEN_ALL_CORE
For information on Fall & Injury Prevention, visit: [email or web address] OR  [email or web address] OR  https://www.cdc.gov/steadi/patient.html

## 2022-10-01 LAB
-  AMPICILLIN/SULBACTAM: SIGNIFICANT CHANGE UP
-  CEFAZOLIN: SIGNIFICANT CHANGE UP
-  GENTAMICIN: SIGNIFICANT CHANGE UP
-  OXACILLIN: SIGNIFICANT CHANGE UP
-  PENICILLIN: SIGNIFICANT CHANGE UP
-  RIFAMPIN: SIGNIFICANT CHANGE UP
-  TETRACYCLINE: SIGNIFICANT CHANGE UP
-  TRIMETHOPRIM/SULFAMETHOXAZOLE: SIGNIFICANT CHANGE UP
-  VANCOMYCIN: SIGNIFICANT CHANGE UP
CULTURE RESULTS: SIGNIFICANT CHANGE UP
METHOD TYPE: SIGNIFICANT CHANGE UP
ORGANISM # SPEC MICROSCOPIC CNT: SIGNIFICANT CHANGE UP
ORGANISM # SPEC MICROSCOPIC CNT: SIGNIFICANT CHANGE UP
SPECIMEN SOURCE: SIGNIFICANT CHANGE UP

## 2022-10-04 DIAGNOSIS — Z85.46 PERSONAL HISTORY OF MALIGNANT NEOPLASM OF PROSTATE: ICD-10-CM

## 2022-10-04 DIAGNOSIS — C88.4 EXTRANODAL MARGINAL ZONE B-CELL LYMPHOMA OF MUCOSA-ASSOCIATED LYMPHOID TISSUE [MALT-LYMPHOMA]: ICD-10-CM

## 2022-10-04 DIAGNOSIS — E78.5 HYPERLIPIDEMIA, UNSPECIFIED: ICD-10-CM

## 2022-10-04 DIAGNOSIS — I10 ESSENTIAL (PRIMARY) HYPERTENSION: ICD-10-CM

## 2022-10-04 DIAGNOSIS — I48.91 UNSPECIFIED ATRIAL FIBRILLATION: ICD-10-CM

## 2022-10-04 DIAGNOSIS — B96.89 OTHER SPECIFIED BACTERIAL AGENTS AS THE CAUSE OF DISEASES CLASSIFIED ELSEWHERE: ICD-10-CM

## 2022-10-04 DIAGNOSIS — N39.0 URINARY TRACT INFECTION, SITE NOT SPECIFIED: ICD-10-CM

## 2022-10-04 DIAGNOSIS — T45.1X5A ADVERSE EFFECT OF ANTINEOPLASTIC AND IMMUNOSUPPRESSIVE DRUGS, INITIAL ENCOUNTER: ICD-10-CM

## 2022-10-04 LAB
CULTURE RESULTS: SIGNIFICANT CHANGE UP
CULTURE RESULTS: SIGNIFICANT CHANGE UP
SPECIMEN SOURCE: SIGNIFICANT CHANGE UP
SPECIMEN SOURCE: SIGNIFICANT CHANGE UP

## 2022-10-12 ENCOUNTER — APPOINTMENT (OUTPATIENT)
Dept: CARDIOLOGY | Facility: CLINIC | Age: 82
End: 2022-10-12

## 2023-04-24 NOTE — ED PROVIDER NOTE - CLINICAL SUMMARY MEDICAL DECISION MAKING FREE TEXT BOX
Include Z78.9 (Other Specified Conditions Influencing Health Status) As An Associated Diagnosis?: No Post-Care Instructions: I reviewed with the patient in detail post-care instructions. Patient is to wear sunprotection, and avoid picking at any of the treated lesions. Pt may apply Vaseline to crusted or scabbing areas. Consent: The patient's consent was obtained including but not limited to risks of crusting, scabbing, blistering, scarring, darker or lighter pigmentary change, recurrence, incomplete removal and infection. Show Aperture Variable?: Yes Detail Level: Detailed Medical Necessity Clause: This procedure was medically necessary because the lesions that were treated were: Medical Necessity Information: It is in your best interest to select a reason for this procedure from the list below. All of these items fulfill various CMS LCD requirements except the new and changing color options. Spray Paint Text: The liquid nitrogen was applied to the skin utilizing a spray paint frosting technique. 83 y/o male with PMHx of non-hodgkins lymphoma, HTN, prostate cancer 2012 presents to the ED c/o chest discomfort and tachycardia up to 140 starting yesterday. Pt presently asymptomatic hr at 130s blood pressure table, Cardiac monitor + tachycardia sudden onset. Plan: EKG chest x-ray, labs including covid swab, BNP, troponin, UA, IV Cardizem for rate control. monitor, observe, reassess expect tele admission. 81 y/o male with PMHx of non-hodgkins lymphoma, HTN, prostate cancer 2012 ambulatory to the ED c/o chest discomfort and tachycardia up to 140 starting yesterday. Pt presently asymptomatic hr at 130s, blood pressure stable, Cardiac monitor + tachycardia/ rapid AF.   Plan: EKG chest x-ray, labs including covid swab, BNP, troponin, UA, IV Cardizem for rate control. monitor, observe, reassess expect tele admission.
